# Patient Record
Sex: FEMALE | Race: WHITE | NOT HISPANIC OR LATINO | Employment: OTHER | URBAN - NONMETROPOLITAN AREA
[De-identification: names, ages, dates, MRNs, and addresses within clinical notes are randomized per-mention and may not be internally consistent; named-entity substitution may affect disease eponyms.]

---

## 2023-02-27 PROBLEM — R11.0 NAUSEA: Status: ACTIVE | Noted: 2023-02-27

## 2023-02-27 PROBLEM — I10 HYPERTENSION, UNCONTROLLED: Status: ACTIVE | Noted: 2023-02-27

## 2023-02-27 PROBLEM — L40.9 PSORIASIS: Status: ACTIVE | Noted: 2023-02-27

## 2023-02-27 PROBLEM — R39.9 UTI SYMPTOMS: Status: ACTIVE | Noted: 2023-02-27

## 2023-02-27 PROBLEM — R92.8 ABNORMAL MAMMOGRAM: Status: ACTIVE | Noted: 2023-02-27

## 2023-02-27 PROBLEM — N31.8 HYPERACTIVITY OF BLADDER: Status: ACTIVE | Noted: 2023-02-27

## 2023-02-27 PROBLEM — R14.0 ABDOMINAL BLOATING WITH CRAMPS: Status: ACTIVE | Noted: 2023-02-27

## 2023-02-27 PROBLEM — E53.8 VITAMIN B 12 DEFICIENCY: Status: ACTIVE | Noted: 2023-02-27

## 2023-02-27 PROBLEM — E11.9 DIABETES MELLITUS TYPE 2, NONINSULIN DEPENDENT (MULTI): Status: ACTIVE | Noted: 2023-02-27

## 2023-02-27 PROBLEM — E83.42 HYPOMAGNESEMIA: Status: ACTIVE | Noted: 2023-02-27

## 2023-02-27 PROBLEM — D89.0 POLYCLONAL GAMMOPATHY: Status: ACTIVE | Noted: 2023-02-27

## 2023-02-27 PROBLEM — I49.1 PAC (PREMATURE ATRIAL CONTRACTION): Status: ACTIVE | Noted: 2023-02-27

## 2023-02-27 PROBLEM — I50.9 CHF (NYHA CLASS II, ACC/AHA STAGE C) (MULTI): Status: ACTIVE | Noted: 2023-02-27

## 2023-02-27 PROBLEM — E87.1 HYPONATREMIA: Status: ACTIVE | Noted: 2023-02-27

## 2023-02-27 PROBLEM — T46.0X1A DIGOXIN TOXICITY: Status: ACTIVE | Noted: 2023-02-27

## 2023-02-27 PROBLEM — G47.00 INSOMNIA: Status: ACTIVE | Noted: 2023-02-27

## 2023-02-27 PROBLEM — E78.00 HYPERCHOLESTEROLEMIA: Status: ACTIVE | Noted: 2023-02-27

## 2023-02-27 PROBLEM — I25.10 2-VESSEL CORONARY ARTERY DISEASE: Status: ACTIVE | Noted: 2023-02-27

## 2023-02-27 PROBLEM — D64.9 ANEMIA: Status: ACTIVE | Noted: 2023-02-27

## 2023-02-27 PROBLEM — E11.29 PROTEINURIA DUE TO TYPE 2 DIABETES MELLITUS (MULTI): Status: ACTIVE | Noted: 2023-02-27

## 2023-02-27 PROBLEM — R60.0 BILATERAL EDEMA OF LOWER EXTREMITY: Status: ACTIVE | Noted: 2023-02-27

## 2023-02-27 PROBLEM — R10.9 ABDOMINAL BLOATING WITH CRAMPS: Status: ACTIVE | Noted: 2023-02-27

## 2023-02-27 PROBLEM — R39.9 URINARY SYMPTOM OR SIGN: Status: ACTIVE | Noted: 2023-02-27

## 2023-02-27 PROBLEM — H40.10X0 OPEN-ANGLE GLAUCOMA: Status: ACTIVE | Noted: 2023-02-27

## 2023-02-27 PROBLEM — R80.9 PROTEINURIA DUE TO TYPE 2 DIABETES MELLITUS (MULTI): Status: ACTIVE | Noted: 2023-02-27

## 2023-02-27 PROBLEM — E55.9 VITAMIN D DEFICIENCY DISEASE: Status: ACTIVE | Noted: 2023-02-27

## 2023-02-27 PROBLEM — I35.1 NONRHEUMATIC AORTIC (VALVE) INSUFFICIENCY: Status: ACTIVE | Noted: 2023-02-27

## 2023-02-27 PROBLEM — R10.30 LOWER ABDOMINAL PAIN: Status: ACTIVE | Noted: 2023-02-27

## 2023-02-27 PROBLEM — R53.82 CHRONIC FATIGUE: Status: ACTIVE | Noted: 2023-02-27

## 2023-02-27 PROBLEM — R77.8 OTHER SPECIFIED ABNORMALITIES OF PLASMA PROTEINS: Status: ACTIVE | Noted: 2023-02-27

## 2023-02-27 PROBLEM — K25.0 ACUTE GASTRIC ULCER WITH HEMORRHAGE: Status: ACTIVE | Noted: 2023-02-27

## 2023-02-27 PROBLEM — I50.40: Status: ACTIVE | Noted: 2023-02-27

## 2023-02-27 PROBLEM — I34.0 MITRAL REGURGITATION: Status: ACTIVE | Noted: 2023-02-27

## 2023-02-27 PROBLEM — K21.9 GASTROESOPHAGEAL REFLUX DISEASE: Status: ACTIVE | Noted: 2023-02-27

## 2023-02-27 PROBLEM — J32.9 CHRONIC SINUSITIS: Status: ACTIVE | Noted: 2023-02-27

## 2023-02-27 PROBLEM — I48.91 ATRIAL FIBRILLATION (MULTI): Status: ACTIVE | Noted: 2023-02-27

## 2023-02-27 PROBLEM — N18.31 STAGE 3A CHRONIC KIDNEY DISEASE (MULTI): Status: ACTIVE | Noted: 2023-02-27

## 2023-02-27 PROBLEM — R92.8 ABNORMAL MAMMOGRAM OF LEFT BREAST: Status: ACTIVE | Noted: 2023-02-27

## 2023-02-27 PROBLEM — E87.6 HYPOKALEMIA: Status: ACTIVE | Noted: 2023-02-27

## 2023-02-27 PROBLEM — K63.5 HYPERPLASTIC COLON POLYP: Status: ACTIVE | Noted: 2023-02-27

## 2023-02-27 RX ORDER — SPIRONOLACTONE 25 MG
1 TABLET ORAL DAILY
COMMUNITY

## 2023-02-27 RX ORDER — BLOOD SUGAR DIAGNOSTIC
STRIP MISCELLANEOUS
COMMUNITY
Start: 2016-07-12 | End: 2023-04-11

## 2023-02-27 RX ORDER — ISOSORBIDE MONONITRATE 60 MG/1
60 TABLET, EXTENDED RELEASE ORAL DAILY
COMMUNITY
Start: 2016-10-26

## 2023-02-27 RX ORDER — CLOPIDOGREL BISULFATE 75 MG/1
75 TABLET ORAL DAILY
COMMUNITY

## 2023-02-27 RX ORDER — AMLODIPINE BESYLATE 5 MG/1
5 TABLET ORAL NIGHTLY
COMMUNITY
Start: 2016-05-26 | End: 2023-04-10

## 2023-02-27 RX ORDER — DAPAGLIFLOZIN 5 MG/1
5 TABLET, FILM COATED ORAL EVERY MORNING
COMMUNITY
End: 2023-03-27 | Stop reason: SINTOL

## 2023-02-27 RX ORDER — FERROUS SULFATE 325(65) MG
1 TABLET ORAL DAILY
COMMUNITY
Start: 2022-08-23

## 2023-02-27 RX ORDER — PANTOPRAZOLE SODIUM 40 MG/1
TABLET, DELAYED RELEASE ORAL
COMMUNITY

## 2023-02-27 RX ORDER — METOPROLOL SUCCINATE 100 MG/1
1 TABLET, EXTENDED RELEASE ORAL DAILY
COMMUNITY
Start: 2016-04-18 | End: 2024-05-22 | Stop reason: ALTCHOICE

## 2023-02-27 RX ORDER — LANCETS
EACH MISCELLANEOUS
COMMUNITY
End: 2023-12-12

## 2023-02-27 RX ORDER — BRIMONIDINE TARTRATE AND TIMOLOL MALEATE 2; 5 MG/ML; MG/ML
SOLUTION OPHTHALMIC
COMMUNITY
Start: 2016-12-09

## 2023-02-27 RX ORDER — MUPIROCIN CALCIUM 20 MG/G
CREAM TOPICAL
COMMUNITY
Start: 2018-01-17 | End: 2023-11-07 | Stop reason: SDUPTHER

## 2023-02-27 RX ORDER — CLOPIDOGREL BISULFATE 75 MG/1
1 TABLET ORAL DAILY
COMMUNITY
Start: 2016-08-09 | End: 2023-05-03 | Stop reason: SDUPTHER

## 2023-02-27 RX ORDER — CALCIUM CARBONATE/VITAMIN D3 500-10/5ML
1 LIQUID (ML) ORAL DAILY
COMMUNITY

## 2023-02-27 RX ORDER — LOVASTATIN 20 MG/1
20 TABLET ORAL DAILY
COMMUNITY
Start: 2014-07-22 | End: 2023-03-27 | Stop reason: SDUPTHER

## 2023-02-27 RX ORDER — SUCRALFATE 1 G/10ML
SUSPENSION ORAL
COMMUNITY
End: 2023-11-07 | Stop reason: ALTCHOICE

## 2023-02-27 RX ORDER — CHOLECALCIFEROL (VITAMIN D3) 25 MCG
TABLET ORAL
COMMUNITY
Start: 2017-05-08

## 2023-02-27 RX ORDER — FUROSEMIDE 20 MG/1
1 TABLET ORAL DAILY
COMMUNITY
Start: 2016-04-18 | End: 2023-05-03 | Stop reason: SDUPTHER

## 2023-02-27 RX ORDER — METFORMIN HYDROCHLORIDE 500 MG/1
500 TABLET ORAL
COMMUNITY
Start: 2015-08-25 | End: 2023-03-27 | Stop reason: SDUPTHER

## 2023-02-27 RX ORDER — LISINOPRIL 20 MG/1
1 TABLET ORAL DAILY
COMMUNITY
Start: 2016-09-29 | End: 2023-03-27 | Stop reason: SDUPTHER

## 2023-02-27 RX ORDER — DIGOXIN 125 MCG
125 TABLET ORAL
COMMUNITY

## 2023-03-03 LAB
APPEARANCE, URINE: CLEAR
BACTERIA, URINE: ABNORMAL /HPF
BILIRUBIN, URINE: NEGATIVE
BLOOD, URINE: NEGATIVE
COLOR, URINE: YELLOW
GLUCOSE, URINE: NEGATIVE MG/DL
HYALINE CASTS, URINE: ABNORMAL /LPF
KETONES, URINE: NEGATIVE MG/DL
LEUKOCYTE ESTERASE, URINE: ABNORMAL
NITRITE, URINE: NEGATIVE
PH, URINE: 6 (ref 5–8)
PROTEIN, URINE: ABNORMAL MG/DL
RBC, URINE: ABNORMAL /HPF (ref 0–5)
SPECIFIC GRAVITY, URINE: 1.01 (ref 1–1.03)
SQUAMOUS EPITHELIAL CELLS, URINE: 1 /HPF
UROBILINOGEN, URINE: <2 MG/DL (ref 0–1.9)
WBC, URINE: 7 /HPF (ref 0–5)

## 2023-03-05 LAB — URINE CULTURE: NO GROWTH

## 2023-03-23 ENCOUNTER — APPOINTMENT (OUTPATIENT)
Dept: PRIMARY CARE | Facility: CLINIC | Age: 83
End: 2023-03-23
Payer: MEDICARE

## 2023-03-27 ENCOUNTER — OFFICE VISIT (OUTPATIENT)
Dept: PRIMARY CARE | Facility: CLINIC | Age: 83
End: 2023-03-27
Payer: MEDICARE

## 2023-03-27 VITALS
OXYGEN SATURATION: 100 % | HEART RATE: 71 BPM | WEIGHT: 111.2 LBS | BODY MASS INDEX: 20.34 KG/M2 | TEMPERATURE: 96.4 F | SYSTOLIC BLOOD PRESSURE: 120 MMHG | DIASTOLIC BLOOD PRESSURE: 70 MMHG

## 2023-03-27 DIAGNOSIS — I48.11 LONGSTANDING PERSISTENT ATRIAL FIBRILLATION (MULTI): ICD-10-CM

## 2023-03-27 DIAGNOSIS — E11.9 DIABETES MELLITUS TYPE 2, NONINSULIN DEPENDENT (MULTI): ICD-10-CM

## 2023-03-27 DIAGNOSIS — R80.9 PROTEINURIA DUE TO TYPE 2 DIABETES MELLITUS (MULTI): ICD-10-CM

## 2023-03-27 DIAGNOSIS — E78.00 HYPERCHOLESTEROLEMIA: ICD-10-CM

## 2023-03-27 DIAGNOSIS — E11.29 PROTEINURIA DUE TO TYPE 2 DIABETES MELLITUS (MULTI): ICD-10-CM

## 2023-03-27 DIAGNOSIS — E46 PROTEIN-CALORIE MALNUTRITION, UNSPECIFIED SEVERITY (MULTI): ICD-10-CM

## 2023-03-27 DIAGNOSIS — N18.31 STAGE 3A CHRONIC KIDNEY DISEASE (MULTI): ICD-10-CM

## 2023-03-27 DIAGNOSIS — I10 HYPERTENSION, UNCONTROLLED: Primary | ICD-10-CM

## 2023-03-27 DIAGNOSIS — I50.40: ICD-10-CM

## 2023-03-27 PROCEDURE — 3074F SYST BP LT 130 MM HG: CPT | Performed by: INTERNAL MEDICINE

## 2023-03-27 PROCEDURE — 1160F RVW MEDS BY RX/DR IN RCRD: CPT | Performed by: INTERNAL MEDICINE

## 2023-03-27 PROCEDURE — 3078F DIAST BP <80 MM HG: CPT | Performed by: INTERNAL MEDICINE

## 2023-03-27 PROCEDURE — 99214 OFFICE O/P EST MOD 30 MIN: CPT | Performed by: INTERNAL MEDICINE

## 2023-03-27 PROCEDURE — 1159F MED LIST DOCD IN RCRD: CPT | Performed by: INTERNAL MEDICINE

## 2023-03-27 PROCEDURE — 1036F TOBACCO NON-USER: CPT | Performed by: INTERNAL MEDICINE

## 2023-03-27 RX ORDER — METFORMIN HYDROCHLORIDE 500 MG/1
500 TABLET ORAL
Qty: 180 TABLET | Refills: 0 | Status: SHIPPED | OUTPATIENT
Start: 2023-03-27 | End: 2023-05-03 | Stop reason: SINTOL

## 2023-03-27 RX ORDER — LISINOPRIL 20 MG/1
20 TABLET ORAL DAILY
Qty: 90 TABLET | Refills: 1 | Status: SHIPPED | OUTPATIENT
Start: 2023-03-27 | End: 2023-09-11

## 2023-03-27 RX ORDER — LOVASTATIN 20 MG/1
20 TABLET ORAL DAILY
Qty: 90 TABLET | Refills: 1 | Status: SHIPPED | OUTPATIENT
Start: 2023-03-27 | End: 2023-09-11

## 2023-03-27 ASSESSMENT — ENCOUNTER SYMPTOMS
ARTHRALGIAS: 0
HEADACHES: 0
SINUS PAIN: 0
COUGH: 0
DIZZINESS: 0
DIARRHEA: 0
BRUISES/BLEEDS EASILY: 0
HYPERTENSION: 1
FATIGUE: 0
BLOOD IN STOOL: 0
DIFFICULTY URINATING: 0
UNEXPECTED WEIGHT CHANGE: 0
WHEEZING: 0
ABDOMINAL PAIN: 0
FEVER: 0
PALPITATIONS: 0
SORE THROAT: 0

## 2023-03-27 NOTE — PROGRESS NOTES
Subjective   Patient ID: Sofie Ulloa is a 82 y.o. female who presents for Hypertension, Hyperlipidemia, Abdominal Pain (Got better so cancelled ct after insurance denied the ct it), Med Refill, and Vaginal Itching (With farxiga, goes when she doesn't take it for a few days).     Patient started on Farxiga did not tolerated due to recurrent urinary urgency frequency and burning sensation  -Dysuria secondary to Farxiga need to discontinue  - Diabetes uncontrolled need to resume metformin 500 mg twice a day reevaluate patient again for side effect in 4 weeks  - Abdominal pain resolved.  Continue with high-fiber diet underlying irritable bowel syndrome  - CHF compensated  -History of gastric ulcer in remission  -Chronic kidney disease stable avoid any salt maximize medical management increase fluid intake  -Hypertension controlled  - Hypercholesterolemia controlled continue with current medication    Hypertension  Pertinent negatives include no chest pain, headaches or palpitations.   Hyperlipidemia  Pertinent negatives include no chest pain.   Abdominal Pain  Pertinent negatives include no arthralgias, diarrhea, fever or headaches.   Med Refill  Pertinent negatives include no abdominal pain, arthralgias, chest pain, congestion, coughing, fatigue, fever, headaches, rash or sore throat.   Vaginal Itching  Pertinent negatives include no abdominal pain, diarrhea, fever, headaches, rash or sore throat.          Review of Systems   Constitutional:  Negative for fatigue, fever and unexpected weight change.   HENT:  Negative for congestion, ear discharge, ear pain, mouth sores, sinus pain and sore throat.    Eyes:  Negative for visual disturbance.   Respiratory:  Negative for cough and wheezing.    Cardiovascular:  Negative for chest pain, palpitations and leg swelling.   Gastrointestinal:  Negative for abdominal pain, blood in stool and diarrhea.   Genitourinary:  Negative for difficulty urinating.   Musculoskeletal:   Negative for arthralgias.   Skin:  Negative for rash.   Neurological:  Negative for dizziness and headaches.   Hematological:  Does not bruise/bleed easily.   Psychiatric/Behavioral:  Negative for behavioral problems.    All other systems reviewed and are negative.      Objective   /70   Pulse 71   Temp 35.8 °C (96.4 °F)   Wt 50.4 kg (111 lb 3.2 oz)   SpO2 100%   BMI 20.34 kg/m²     Physical Exam  Vitals and nursing note reviewed.   Constitutional:       Appearance: Normal appearance.   HENT:      Head: Normocephalic.      Nose: Nose normal.   Eyes:      Conjunctiva/sclera: Conjunctivae normal.      Pupils: Pupils are equal, round, and reactive to light.   Cardiovascular:      Rate and Rhythm: Regular rhythm.   Pulmonary:      Effort: Pulmonary effort is normal.      Breath sounds: Normal breath sounds.   Abdominal:      General: Abdomen is flat.      Palpations: Abdomen is soft.   Musculoskeletal:      Cervical back: Neck supple.   Skin:     General: Skin is warm.   Neurological:      General: No focal deficit present.      Mental Status: She is oriented to person, place, and time.   Psychiatric:         Mood and Affect: Mood normal.         Assessment/Plan   Problem List Items Addressed This Visit          Circulatory    Combined systolic and diastolic congestive heart failure, NYHA class 2, unspecified congestive heart failure chronicity (CMS/HCC)    Atrial fibrillation (CMS/HCC)    Hypertension, uncontrolled - Primary    Relevant Medications    lisinopril 20 mg tablet       Genitourinary    Stage 3a chronic kidney disease       Endocrine/Metabolic    Diabetes mellitus type 2, noninsulin dependent (CMS/HCC)    Relevant Medications    metFORMIN (Glucophage) 500 mg tablet    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 (CMS/HCC)    Protein-calorie malnutrition, unspecified severity (CMS/HCC)       Other    Hypercholesterolemia    Relevant Medications    lovastatin (Mevacor) 20 mg tablet

## 2023-04-07 DIAGNOSIS — E11.9 TYPE 2 DIABETES MELLITUS WITHOUT COMPLICATIONS (MULTI): ICD-10-CM

## 2023-04-08 DIAGNOSIS — I10 ESSENTIAL (PRIMARY) HYPERTENSION: ICD-10-CM

## 2023-04-10 RX ORDER — AMLODIPINE BESYLATE 5 MG/1
TABLET ORAL
Qty: 30 TABLET | Refills: 0 | Status: SHIPPED | OUTPATIENT
Start: 2023-04-10 | End: 2023-06-09 | Stop reason: SDUPTHER

## 2023-04-11 RX ORDER — BLOOD SUGAR DIAGNOSTIC
STRIP MISCELLANEOUS
Qty: 100 STRIP | Refills: 1 | Status: SHIPPED | OUTPATIENT
Start: 2023-04-11 | End: 2023-07-18

## 2023-05-03 ENCOUNTER — OFFICE VISIT (OUTPATIENT)
Dept: PRIMARY CARE | Facility: CLINIC | Age: 83
End: 2023-05-03
Payer: MEDICARE

## 2023-05-03 VITALS
SYSTOLIC BLOOD PRESSURE: 128 MMHG | DIASTOLIC BLOOD PRESSURE: 64 MMHG | TEMPERATURE: 96.1 F | OXYGEN SATURATION: 98 % | HEART RATE: 56 BPM | WEIGHT: 109.8 LBS | BODY MASS INDEX: 21.09 KG/M2

## 2023-05-03 DIAGNOSIS — E11.65 TYPE 2 DIABETES MELLITUS WITH HYPERGLYCEMIA, WITHOUT LONG-TERM CURRENT USE OF INSULIN (MULTI): Primary | ICD-10-CM

## 2023-05-03 DIAGNOSIS — R60.0 BILATERAL EDEMA OF LOWER EXTREMITY: ICD-10-CM

## 2023-05-03 DIAGNOSIS — R10.9 ABDOMINAL BLOATING WITH CRAMPS: ICD-10-CM

## 2023-05-03 DIAGNOSIS — R14.0 ABDOMINAL BLOATING WITH CRAMPS: ICD-10-CM

## 2023-05-03 DIAGNOSIS — I10 HYPERTENSION, UNCONTROLLED: ICD-10-CM

## 2023-05-03 PROCEDURE — 3078F DIAST BP <80 MM HG: CPT | Performed by: INTERNAL MEDICINE

## 2023-05-03 PROCEDURE — 1157F ADVNC CARE PLAN IN RCRD: CPT | Performed by: INTERNAL MEDICINE

## 2023-05-03 PROCEDURE — 99214 OFFICE O/P EST MOD 30 MIN: CPT | Performed by: INTERNAL MEDICINE

## 2023-05-03 PROCEDURE — 1159F MED LIST DOCD IN RCRD: CPT | Performed by: INTERNAL MEDICINE

## 2023-05-03 PROCEDURE — 1036F TOBACCO NON-USER: CPT | Performed by: INTERNAL MEDICINE

## 2023-05-03 PROCEDURE — 1160F RVW MEDS BY RX/DR IN RCRD: CPT | Performed by: INTERNAL MEDICINE

## 2023-05-03 PROCEDURE — 3074F SYST BP LT 130 MM HG: CPT | Performed by: INTERNAL MEDICINE

## 2023-05-03 RX ORDER — FUROSEMIDE 20 MG/1
20 TABLET ORAL DAILY
Qty: 90 TABLET | Refills: 1 | Status: SHIPPED | OUTPATIENT
Start: 2023-05-03 | End: 2023-10-02

## 2023-05-03 ASSESSMENT — ENCOUNTER SYMPTOMS
ABDOMINAL PAIN: 0
DIFFICULTY URINATING: 0
SORE THROAT: 0
DIZZINESS: 0
HYPERTENSION: 1
ARTHRALGIAS: 0
COUGH: 0
BLOOD IN STOOL: 0
HEADACHES: 0
FATIGUE: 0
PALPITATIONS: 0
UNEXPECTED WEIGHT CHANGE: 0
WHEEZING: 0
FEVER: 0
SINUS PAIN: 0
DIARRHEA: 0
BRUISES/BLEEDS EASILY: 0

## 2023-05-03 NOTE — PROGRESS NOTES
Subjective   Patient ID: Sofie Ulloa is a 82 y.o. female who presents for Hypertension, Diabetes (Metformin has been bothering her stomach, has stopped over the last week), and Hyperlipidemia.    - Diabetes uncontrolled patient started metformin again developed abdominal cramps did not tolerated patient discontinued did not tolerate Farxiga due to urinary tract infections  Blood sugar range 1 14-1 60  Continue with diet control  Obtain hemoglobin A1c in 3 months  Start on Januvia follow-up results closely  -Patient counseled about underweight increase protein in her diet follow-up BMI closely  - CHF compensated  - Chronic hyponatremia under control  -History of gastric ulcer in remission  -Chronic kidney disease stable avoid any salt maximize medical management increase fluid intake  -Hypertension controlled  - Hypercholesterolemia controlled continue with current medication        Hypertension  Pertinent negatives include no chest pain, headaches or palpitations.   Diabetes  Pertinent negatives for hypoglycemia include no dizziness or headaches. Pertinent negatives for diabetes include no chest pain and no fatigue.   Hyperlipidemia  Pertinent negatives include no chest pain.          Review of Systems   Constitutional:  Negative for fatigue, fever and unexpected weight change.   HENT:  Negative for congestion, ear discharge, ear pain, mouth sores, sinus pain and sore throat.    Eyes:  Negative for visual disturbance.   Respiratory:  Negative for cough and wheezing.    Cardiovascular:  Negative for chest pain, palpitations and leg swelling.   Gastrointestinal:  Negative for abdominal pain, blood in stool and diarrhea.   Genitourinary:  Negative for difficulty urinating.   Musculoskeletal:  Negative for arthralgias.   Skin:  Negative for rash.   Neurological:  Negative for dizziness and headaches.   Hematological:  Does not bruise/bleed easily.   Psychiatric/Behavioral:  Negative for behavioral problems.    All other  systems reviewed and are negative.      Objective   No results found for: HGBA1C   /64   Pulse 56   Temp 35.6 °C (96.1 °F)   Wt 49.8 kg (109 lb 12.8 oz)   SpO2 98%   BMI 21.09 kg/m²   Lab Results   Component Value Date    WBC 10.1 02/16/2023    HGB 12.4 02/16/2023    HCT 39.5 02/16/2023     02/16/2023    CHOL 118 08/12/2022    TRIG 130 08/12/2022    HDL 40.0 08/12/2022    ALT 7 09/26/2022    AST 16 09/26/2022     (L) 02/16/2023    K 4.3 02/16/2023    CL 96 (L) 02/16/2023    CREATININE 0.90 02/16/2023    BUN 10 02/16/2023    CO2 29 02/16/2023    TSH 1.41 08/12/2022    INR 1.5 (H) 12/09/2019     par   Physical Exam  Vitals and nursing note reviewed.   Constitutional:       Appearance: Normal appearance.   HENT:      Head: Normocephalic.      Nose: Nose normal.   Eyes:      Conjunctiva/sclera: Conjunctivae normal.      Pupils: Pupils are equal, round, and reactive to light.   Cardiovascular:      Rate and Rhythm: Regular rhythm.   Pulmonary:      Effort: Pulmonary effort is normal.      Breath sounds: Normal breath sounds.   Abdominal:      General: Abdomen is flat.      Palpations: Abdomen is soft.   Musculoskeletal:      Cervical back: Neck supple.   Skin:     General: Skin is warm.   Neurological:      General: No focal deficit present.      Mental Status: She is oriented to person, place, and time.   Psychiatric:         Mood and Affect: Mood normal.         Assessment/Plan   Sofie was seen today for hypertension, diabetes and hyperlipidemia.  Diagnoses and all orders for this visit:  Type 2 diabetes mellitus with hyperglycemia, without long-term current use of insulin (CMS/[facility]) (Primary)  -     SITagliptin phosphate (Januvia) 100 mg tablet; Take 1 tablet (100 mg) by mouth once daily.  Bilateral edema of lower extremity  -     furosemide (Lasix) 20 mg tablet; Take 1 tablet (20 mg) by mouth once daily.  Abdominal bloating with cramps  Hypertension, uncontrolled  Other orders  -     Follow Up  In Primary Care  -     3 Month Follow Up In Primary Care; Future  -     Follow Up In Primary Care; Future     - Diabetes uncontrolled patient started metformin again developed abdominal cramps did not tolerated patient discontinued did not tolerate Farxiga due to urinary tract infections  Blood sugar range 1 14-1 60  Continue with diet control  Obtain hemoglobin A1c in 3 months  Start on Januvia follow-up results closely  -Patient counseled about underweight increase protein in her diet follow-up BMI closely  - CHF compensated  - Chronic hyponatremia under control  -History of gastric ulcer in remission  -Chronic kidney disease stable avoid any salt maximize medical management increase fluid intake  -Hypertension controlled  - Hypercholesterolemia controlled continue with current medication

## 2023-06-07 ENCOUNTER — HOSPITAL ENCOUNTER (OUTPATIENT)
Dept: DATA CONVERSION | Facility: HOSPITAL | Age: 83
End: 2023-06-07
Attending: INTERNAL MEDICINE | Admitting: INTERNAL MEDICINE
Payer: MEDICARE

## 2023-06-07 DIAGNOSIS — E78.5 HYPERLIPIDEMIA, UNSPECIFIED: ICD-10-CM

## 2023-06-07 DIAGNOSIS — I34.0 NONRHEUMATIC MITRAL (VALVE) INSUFFICIENCY: ICD-10-CM

## 2023-06-07 DIAGNOSIS — I50.9 HEART FAILURE, UNSPECIFIED (MULTI): ICD-10-CM

## 2023-06-07 DIAGNOSIS — M19.90 UNSPECIFIED OSTEOARTHRITIS, UNSPECIFIED SITE: ICD-10-CM

## 2023-06-07 DIAGNOSIS — I13.0 HYPERTENSIVE HEART AND CHRONIC KIDNEY DISEASE WITH HEART FAILURE AND STAGE 1 THROUGH STAGE 4 CHRONIC KIDNEY DISEASE, OR UNSPECIFIED CHRONIC KIDNEY DISEASE (MULTI): ICD-10-CM

## 2023-06-07 DIAGNOSIS — Z88.1 ALLERGY STATUS TO OTHER ANTIBIOTIC AGENTS: ICD-10-CM

## 2023-06-07 DIAGNOSIS — E83.42 HYPOMAGNESEMIA: ICD-10-CM

## 2023-06-07 DIAGNOSIS — D89.0 POLYCLONAL HYPERGAMMAGLOBULINEMIA: ICD-10-CM

## 2023-06-07 DIAGNOSIS — E11.22 TYPE 2 DIABETES MELLITUS WITH DIABETIC CHRONIC KIDNEY DISEASE (MULTI): ICD-10-CM

## 2023-06-07 DIAGNOSIS — I48.20 CHRONIC ATRIAL FIBRILLATION, UNSPECIFIED (MULTI): ICD-10-CM

## 2023-06-07 DIAGNOSIS — Z88.2 ALLERGY STATUS TO SULFONAMIDES: ICD-10-CM

## 2023-06-07 DIAGNOSIS — R63.4 ABNORMAL WEIGHT LOSS: ICD-10-CM

## 2023-06-07 DIAGNOSIS — E87.1 HYPO-OSMOLALITY AND HYPONATREMIA: ICD-10-CM

## 2023-06-07 DIAGNOSIS — Z12.11 ENCOUNTER FOR SCREENING FOR MALIGNANT NEOPLASM OF COLON: ICD-10-CM

## 2023-06-07 DIAGNOSIS — K21.00 GASTRO-ESOPHAGEAL REFLUX DISEASE WITH ESOPHAGITIS, WITHOUT BLEEDING: ICD-10-CM

## 2023-06-07 DIAGNOSIS — N18.30 CHRONIC KIDNEY DISEASE, STAGE 3 UNSPECIFIED (MULTI): ICD-10-CM

## 2023-06-07 DIAGNOSIS — Z79.01 LONG TERM (CURRENT) USE OF ANTICOAGULANTS: ICD-10-CM

## 2023-06-07 DIAGNOSIS — D50.9 IRON DEFICIENCY ANEMIA, UNSPECIFIED: ICD-10-CM

## 2023-06-07 DIAGNOSIS — I25.119 ATHEROSCLEROTIC HEART DISEASE OF NATIVE CORONARY ARTERY WITH UNSPECIFIED ANGINA PECTORIS (CMS-HCC): ICD-10-CM

## 2023-06-07 DIAGNOSIS — R06.00 DYSPNEA, UNSPECIFIED: ICD-10-CM

## 2023-06-07 LAB — POCT GLUCOSE: 129 MG/DL (ref 74–99)

## 2023-06-09 DIAGNOSIS — I10 ESSENTIAL (PRIMARY) HYPERTENSION: ICD-10-CM

## 2023-06-09 RX ORDER — AMLODIPINE BESYLATE 5 MG/1
TABLET ORAL
Qty: 90 TABLET | Refills: 0 | Status: SHIPPED | OUTPATIENT
Start: 2023-06-09 | End: 2023-08-07 | Stop reason: SDUPTHER

## 2023-06-16 LAB
COMPLETE PATHOLOGY REPORT: NORMAL
CONVERTED CLINICAL DIAGNOSIS-HISTORY: NORMAL
CONVERTED FINAL DIAGNOSIS: NORMAL
CONVERTED FINAL REPORT PDF LINK TO COPY AND PASTE: NORMAL
CONVERTED GROSS DESCRIPTION: NORMAL

## 2023-07-18 DIAGNOSIS — E11.9 TYPE 2 DIABETES MELLITUS WITHOUT COMPLICATIONS (MULTI): ICD-10-CM

## 2023-07-18 RX ORDER — BLOOD SUGAR DIAGNOSTIC
STRIP MISCELLANEOUS
Qty: 100 STRIP | Refills: 1 | Status: SHIPPED | OUTPATIENT
Start: 2023-07-18 | End: 2024-02-06

## 2023-08-07 ENCOUNTER — OFFICE VISIT (OUTPATIENT)
Dept: PRIMARY CARE | Facility: CLINIC | Age: 83
End: 2023-08-07
Payer: MEDICARE

## 2023-08-07 VITALS
OXYGEN SATURATION: 98 % | HEART RATE: 65 BPM | SYSTOLIC BLOOD PRESSURE: 120 MMHG | BODY MASS INDEX: 21.09 KG/M2 | DIASTOLIC BLOOD PRESSURE: 62 MMHG | WEIGHT: 109.8 LBS | TEMPERATURE: 98.4 F

## 2023-08-07 DIAGNOSIS — E11.29 PROTEINURIA DUE TO TYPE 2 DIABETES MELLITUS (MULTI): ICD-10-CM

## 2023-08-07 DIAGNOSIS — K58.9 IRRITABLE BOWEL SYNDROME, UNSPECIFIED TYPE: ICD-10-CM

## 2023-08-07 DIAGNOSIS — E11.65 TYPE 2 DIABETES MELLITUS WITH HYPERGLYCEMIA, WITHOUT LONG-TERM CURRENT USE OF INSULIN (MULTI): ICD-10-CM

## 2023-08-07 DIAGNOSIS — I35.1 NONRHEUMATIC AORTIC (VALVE) INSUFFICIENCY: ICD-10-CM

## 2023-08-07 DIAGNOSIS — R80.9 PROTEINURIA DUE TO TYPE 2 DIABETES MELLITUS (MULTI): ICD-10-CM

## 2023-08-07 DIAGNOSIS — E78.00 HYPERCHOLESTEROLEMIA: ICD-10-CM

## 2023-08-07 DIAGNOSIS — I25.10 2-VESSEL CORONARY ARTERY DISEASE: ICD-10-CM

## 2023-08-07 DIAGNOSIS — I10 ESSENTIAL (PRIMARY) HYPERTENSION: ICD-10-CM

## 2023-08-07 DIAGNOSIS — I10 HYPERTENSION, UNCONTROLLED: ICD-10-CM

## 2023-08-07 DIAGNOSIS — Z12.31 ENCOUNTER FOR SCREENING MAMMOGRAM FOR MALIGNANT NEOPLASM OF BREAST: Primary | ICD-10-CM

## 2023-08-07 PROCEDURE — 3078F DIAST BP <80 MM HG: CPT | Performed by: INTERNAL MEDICINE

## 2023-08-07 PROCEDURE — 1160F RVW MEDS BY RX/DR IN RCRD: CPT | Performed by: INTERNAL MEDICINE

## 2023-08-07 PROCEDURE — 99214 OFFICE O/P EST MOD 30 MIN: CPT | Performed by: INTERNAL MEDICINE

## 2023-08-07 PROCEDURE — 1159F MED LIST DOCD IN RCRD: CPT | Performed by: INTERNAL MEDICINE

## 2023-08-07 PROCEDURE — 3074F SYST BP LT 130 MM HG: CPT | Performed by: INTERNAL MEDICINE

## 2023-08-07 PROCEDURE — 1036F TOBACCO NON-USER: CPT | Performed by: INTERNAL MEDICINE

## 2023-08-07 PROCEDURE — 1157F ADVNC CARE PLAN IN RCRD: CPT | Performed by: INTERNAL MEDICINE

## 2023-08-07 RX ORDER — AMLODIPINE BESYLATE 5 MG/1
5 TABLET ORAL DAILY
Qty: 90 TABLET | Refills: 0 | Status: SHIPPED | OUTPATIENT
Start: 2023-08-07 | End: 2023-11-07 | Stop reason: SDUPTHER

## 2023-08-07 ASSESSMENT — ENCOUNTER SYMPTOMS
HEADACHES: 0
ARTHRALGIAS: 0
BRUISES/BLEEDS EASILY: 0
UNEXPECTED WEIGHT CHANGE: 0
WHEEZING: 0
DIZZINESS: 0
DIARRHEA: 0
DIFFICULTY URINATING: 0
BLOOD IN STOOL: 0
PALPITATIONS: 0
COUGH: 0
SORE THROAT: 0
FEVER: 0
ABDOMINAL PAIN: 0
SINUS PAIN: 0
FATIGUE: 0

## 2023-08-07 ASSESSMENT — PATIENT HEALTH QUESTIONNAIRE - PHQ9
SUM OF ALL RESPONSES TO PHQ9 QUESTIONS 1 AND 2: 0
1. LITTLE INTEREST OR PLEASURE IN DOING THINGS: NOT AT ALL
2. FEELING DOWN, DEPRESSED OR HOPELESS: NOT AT ALL

## 2023-08-07 NOTE — PROGRESS NOTES
"Subjective   Patient ID: Sofie Ulloa is a 83 y.o. female who presents for Follow-up (3 mth follow up. Had scopes stomach is feeling better. ) and Medication Issue (Since starting januvia sugars have not been that good. Does not feel like it is helping. Takes eliquis  would like to talk about a program to help pay for it. Yesenia Mujica was supposed to send an email about it. ).    - Diabetes doing much better now on Januvia blood sugar record from home controlled follow-up hemoglobin A1c in 3 months  Did not tolerate metformin  -Seen by gastroenterology upper and lower endoscopy no significant disease  - IBS continue monitoring closely symptoms are controlled now  - CHF compensated  - Chronic hyponatremia under control follow-up BMP  -History of gastric ulcer in remission  -Chronic kidney disease stable avoid any salt maximize medical management increase fluid intake  -Hypertension controlled  - Hypercholesterolemia controlled continue with current medication  Needs screening mammogram before next appointment  Follow-up 3 months                Review of Systems   Constitutional:  Negative for fatigue, fever and unexpected weight change.   HENT:  Negative for congestion, ear discharge, ear pain, mouth sores, sinus pain and sore throat.    Eyes:  Negative for visual disturbance.   Respiratory:  Negative for cough and wheezing.    Cardiovascular:  Negative for chest pain, palpitations and leg swelling.   Gastrointestinal:  Negative for abdominal pain, blood in stool and diarrhea.   Genitourinary:  Negative for difficulty urinating.   Musculoskeletal:  Negative for arthralgias.   Skin:  Negative for rash.   Neurological:  Negative for dizziness and headaches.   Hematological:  Does not bruise/bleed easily.   Psychiatric/Behavioral:  Negative for behavioral problems.    All other systems reviewed and are negative.      Objective   No results found for: \"HGBA1C\"   /62   Pulse 65   Temp 36.9 °C (98.4 °F)   Wt 49.8 " kg (109 lb 12.8 oz)   SpO2 98%   BMI 21.09 kg/m²   Lab Results   Component Value Date    WBC 10.1 02/16/2023    HGB 12.4 02/16/2023    HCT 39.5 02/16/2023     02/16/2023    CHOL 118 08/12/2022    TRIG 130 08/12/2022    HDL 40.0 08/12/2022    ALT 7 09/26/2022    AST 16 09/26/2022     (L) 02/16/2023    K 4.3 02/16/2023    CL 96 (L) 02/16/2023    CREATININE 0.90 02/16/2023    BUN 10 02/16/2023    CO2 29 02/16/2023    TSH 1.41 08/12/2022    INR 1.5 (H) 12/09/2019     par   Physical Exam  Vitals and nursing note reviewed.   Constitutional:       Appearance: Normal appearance.   HENT:      Head: Normocephalic.      Nose: Nose normal.   Eyes:      Conjunctiva/sclera: Conjunctivae normal.      Pupils: Pupils are equal, round, and reactive to light.   Cardiovascular:      Rate and Rhythm: Regular rhythm.   Pulmonary:      Effort: Pulmonary effort is normal.      Breath sounds: Normal breath sounds.   Abdominal:      General: Abdomen is flat.      Palpations: Abdomen is soft.   Musculoskeletal:      Cervical back: Neck supple.   Skin:     General: Skin is warm.   Neurological:      General: No focal deficit present.      Mental Status: She is oriented to person, place, and time.   Psychiatric:         Mood and Affect: Mood normal.         Assessment/Plan   Sofie was seen today for follow-up and medication issue.  Diagnoses and all orders for this visit:  Encounter for screening mammogram for malignant neoplasm of breast (Primary)  -     BI mammo bilateral screening tomosynthesis; Future  Essential (primary) hypertension  -     amLODIPine (Norvasc) 5 mg tablet; Take 1 tablet (5 mg) by mouth once daily.  -     Basic metabolic panel; Future  Nonrheumatic aortic (valve) insufficiency  -     apixaban (Eliquis) 2.5 mg tablet; Take 1 tablet (2.5 mg) by mouth 2 times a day.  2-vessel coronary artery disease  Type 2 diabetes mellitus with hyperglycemia, without long-term current use of insulin (CMS/HCC)  Hypertension,  uncontrolled  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 (CMS/HCC)  Hypercholesterolemia  Irritable bowel syndrome, unspecified type  Other orders  -     3 Month Follow Up In Primary Care  -     Follow Up In Primary Care  -     Follow Up In Primary Care - Established; Future   - Diabetes doing much better now on Januvia blood sugar record from home controlled follow-up hemoglobin A1c in 3 months  Did not tolerate metformin  -Seen by gastroenterology upper and lower endoscopy no significant disease  - IBS continue monitoring closely symptoms are controlled now  - CHF compensated  - Chronic hyponatremia under control follow-up BMP  -History of gastric ulcer in remission  -Chronic kidney disease stable avoid any salt maximize medical management increase fluid intake  -Hypertension controlled  - Hypercholesterolemia controlled continue with current medication  Needs screening mammogram before next appointment  Follow-up 3 months

## 2023-08-10 DIAGNOSIS — I35.1 NONRHEUMATIC AORTIC (VALVE) INSUFFICIENCY: ICD-10-CM

## 2023-09-07 VITALS — BODY MASS INDEX: 20.28 KG/M2 | WEIGHT: 110.23 LBS | HEIGHT: 62 IN

## 2023-09-09 DIAGNOSIS — I10 HYPERTENSION, UNCONTROLLED: ICD-10-CM

## 2023-09-09 DIAGNOSIS — E78.00 HYPERCHOLESTEROLEMIA: ICD-10-CM

## 2023-09-11 RX ORDER — LISINOPRIL 20 MG/1
20 TABLET ORAL DAILY
Qty: 90 TABLET | Refills: 1 | Status: SHIPPED | OUTPATIENT
Start: 2023-09-11 | End: 2023-11-07 | Stop reason: SDUPTHER

## 2023-09-11 RX ORDER — LOVASTATIN 20 MG/1
20 TABLET ORAL DAILY
Qty: 90 TABLET | Refills: 1 | Status: SHIPPED | OUTPATIENT
Start: 2023-09-11 | End: 2023-11-07 | Stop reason: SDUPTHER

## 2023-09-30 NOTE — H&P
History & Physical Reviewed:   I have reviewed the History and Physical dated:  16-May-2023   History and Physical reviewed and relevant findings noted. Patient examined to review pertinent physical  findings.: No significant changes   Home Medications Reviewed: no changes noted   Allergies Reviewed: no changes noted       ERAS (Enhanced Recovery After Surgery):  ·  ERAS Patient: no     Consent:   COVID-19 Consent:  ·  COVID-19 Risk Consent Surgeon has reviewed key risks related to the risk of tia COVID-19 and if they contract COVID-19 what the risks are.       Electronic Signatures:  Nakia Gutiérrez (PAC)   (Signed 07-Jun-2023 07:27)   Entered: ERAS, Consent, Note Completion, History & Physical Reviewed   Authored: ERAS, Consent, History & Physical Reviewed, Note Completion  Rufus Riley ()   (Signed 21-Jun-2023 07:52)   Co-Signer: ERAS, Consent, Note Completion, History & Physical Reviewed    Last Updated: 21-Jun-2023 07:52 by Rufus Riley ()

## 2023-10-02 DIAGNOSIS — R60.0 BILATERAL EDEMA OF LOWER EXTREMITY: ICD-10-CM

## 2023-10-02 DIAGNOSIS — E11.65 TYPE 2 DIABETES MELLITUS WITH HYPERGLYCEMIA, WITHOUT LONG-TERM CURRENT USE OF INSULIN (MULTI): ICD-10-CM

## 2023-10-02 RX ORDER — SITAGLIPTIN 100 MG/1
100 TABLET, FILM COATED ORAL DAILY
Qty: 30 TABLET | Refills: 0 | Status: SHIPPED | OUTPATIENT
Start: 2023-10-02 | End: 2023-10-23

## 2023-10-02 RX ORDER — FUROSEMIDE 20 MG/1
20 TABLET ORAL DAILY
Qty: 30 TABLET | Refills: 0 | Status: SHIPPED | OUTPATIENT
Start: 2023-10-02 | End: 2023-11-07 | Stop reason: SDUPTHER

## 2023-10-09 DIAGNOSIS — I48.11 LONGSTANDING PERSISTENT ATRIAL FIBRILLATION (MULTI): ICD-10-CM

## 2023-10-09 DIAGNOSIS — T46.0X4D: ICD-10-CM

## 2023-10-13 ENCOUNTER — HOSPITAL ENCOUNTER (OUTPATIENT)
Dept: RADIOLOGY | Facility: HOSPITAL | Age: 83
Discharge: HOME | End: 2023-10-13
Payer: MEDICARE

## 2023-10-13 DIAGNOSIS — Z12.31 ENCOUNTER FOR SCREENING MAMMOGRAM FOR MALIGNANT NEOPLASM OF BREAST: ICD-10-CM

## 2023-10-13 PROCEDURE — 77063 BREAST TOMOSYNTHESIS BI: CPT | Mod: BILATERAL PROCEDURE | Performed by: RADIOLOGY

## 2023-10-13 PROCEDURE — 77067 SCR MAMMO BI INCL CAD: CPT | Mod: 50

## 2023-10-13 PROCEDURE — 77067 SCR MAMMO BI INCL CAD: CPT | Mod: BILATERAL PROCEDURE | Performed by: RADIOLOGY

## 2023-10-17 ENCOUNTER — LAB (OUTPATIENT)
Dept: LAB | Facility: LAB | Age: 83
End: 2023-10-17
Payer: MEDICARE

## 2023-10-17 DIAGNOSIS — I10 ESSENTIAL (PRIMARY) HYPERTENSION: ICD-10-CM

## 2023-10-17 DIAGNOSIS — I48.11 LONGSTANDING PERSISTENT ATRIAL FIBRILLATION (MULTI): ICD-10-CM

## 2023-10-17 LAB
ANION GAP SERPL CALC-SCNC: 13 MMOL/L (ref 10–20)
BUN SERPL-MCNC: 14 MG/DL (ref 6–23)
CALCIUM SERPL-MCNC: 9.6 MG/DL (ref 8.6–10.3)
CHLORIDE SERPL-SCNC: 93 MMOL/L (ref 98–107)
CO2 SERPL-SCNC: 30 MMOL/L (ref 21–32)
CREAT SERPL-MCNC: 1.01 MG/DL (ref 0.5–1.05)
DIGOXIN SERPL-MCNC: 0.69 NG/ML (ref 0.8–?)
GFR SERPL CREATININE-BSD FRML MDRD: 55 ML/MIN/1.73M*2
GLUCOSE SERPL-MCNC: 155 MG/DL (ref 74–99)
POTASSIUM SERPL-SCNC: 4.4 MMOL/L (ref 3.5–5.3)
SODIUM SERPL-SCNC: 132 MMOL/L (ref 136–145)

## 2023-10-17 PROCEDURE — 36415 COLL VENOUS BLD VENIPUNCTURE: CPT

## 2023-10-21 DIAGNOSIS — E11.65 TYPE 2 DIABETES MELLITUS WITH HYPERGLYCEMIA, WITHOUT LONG-TERM CURRENT USE OF INSULIN (MULTI): ICD-10-CM

## 2023-10-23 ENCOUNTER — HOSPITAL ENCOUNTER (OUTPATIENT)
Dept: RADIOLOGY | Facility: HOSPITAL | Age: 83
Discharge: HOME | End: 2023-10-23
Payer: MEDICARE

## 2023-10-23 DIAGNOSIS — R92.8 ABNORMAL MAMMOGRAM: ICD-10-CM

## 2023-10-23 PROCEDURE — 77066 DX MAMMO INCL CAD BI: CPT

## 2023-10-23 PROCEDURE — 77066 DX MAMMO INCL CAD BI: CPT | Performed by: RADIOLOGY

## 2023-10-23 RX ORDER — SITAGLIPTIN 100 MG/1
100 TABLET, FILM COATED ORAL DAILY
Qty: 90 TABLET | Refills: 1 | Status: SHIPPED | OUTPATIENT
Start: 2023-10-23 | End: 2023-11-07 | Stop reason: ALTCHOICE

## 2023-11-07 ENCOUNTER — OFFICE VISIT (OUTPATIENT)
Dept: PRIMARY CARE | Facility: CLINIC | Age: 83
End: 2023-11-07
Payer: MEDICARE

## 2023-11-07 VITALS
TEMPERATURE: 96.2 F | OXYGEN SATURATION: 98 % | BODY MASS INDEX: 19.63 KG/M2 | SYSTOLIC BLOOD PRESSURE: 154 MMHG | WEIGHT: 107.2 LBS | HEART RATE: 74 BPM | DIASTOLIC BLOOD PRESSURE: 62 MMHG

## 2023-11-07 DIAGNOSIS — I35.1 NONRHEUMATIC AORTIC (VALVE) INSUFFICIENCY: ICD-10-CM

## 2023-11-07 DIAGNOSIS — I10 ESSENTIAL (PRIMARY) HYPERTENSION: ICD-10-CM

## 2023-11-07 DIAGNOSIS — R60.0 BILATERAL EDEMA OF LOWER EXTREMITY: ICD-10-CM

## 2023-11-07 DIAGNOSIS — E78.00 HYPERCHOLESTEROLEMIA: ICD-10-CM

## 2023-11-07 DIAGNOSIS — E11.9 DIABETES MELLITUS TYPE 2, NONINSULIN DEPENDENT (MULTI): ICD-10-CM

## 2023-11-07 DIAGNOSIS — I10 HYPERTENSION, UNCONTROLLED: ICD-10-CM

## 2023-11-07 DIAGNOSIS — J32.9 CHRONIC SINUSITIS, UNSPECIFIED LOCATION: Primary | ICD-10-CM

## 2023-11-07 DIAGNOSIS — R92.8 ABNORMAL MAMMOGRAM: ICD-10-CM

## 2023-11-07 LAB
POC FINGERSTICK BLOOD GLUCOSE: 142 MG/DL (ref 70–100)
POC HEMOGLOBIN A1C: 6.9 % (ref 4.2–6.5)

## 2023-11-07 PROCEDURE — 82962 GLUCOSE BLOOD TEST: CPT | Performed by: INTERNAL MEDICINE

## 2023-11-07 PROCEDURE — 3077F SYST BP >= 140 MM HG: CPT | Performed by: INTERNAL MEDICINE

## 2023-11-07 PROCEDURE — 1036F TOBACCO NON-USER: CPT | Performed by: INTERNAL MEDICINE

## 2023-11-07 PROCEDURE — 83036 HEMOGLOBIN GLYCOSYLATED A1C: CPT | Performed by: INTERNAL MEDICINE

## 2023-11-07 PROCEDURE — 1160F RVW MEDS BY RX/DR IN RCRD: CPT | Performed by: INTERNAL MEDICINE

## 2023-11-07 PROCEDURE — 99214 OFFICE O/P EST MOD 30 MIN: CPT | Performed by: INTERNAL MEDICINE

## 2023-11-07 PROCEDURE — 3078F DIAST BP <80 MM HG: CPT | Performed by: INTERNAL MEDICINE

## 2023-11-07 PROCEDURE — 1159F MED LIST DOCD IN RCRD: CPT | Performed by: INTERNAL MEDICINE

## 2023-11-07 RX ORDER — LINAGLIPTIN 5 MG/1
5 TABLET, FILM COATED ORAL DAILY
Qty: 90 TABLET | Refills: 3 | Status: SHIPPED | OUTPATIENT
Start: 2023-11-07 | End: 2024-11-06

## 2023-11-07 RX ORDER — FUROSEMIDE 20 MG/1
20 TABLET ORAL DAILY
Qty: 90 TABLET | Refills: 1 | Status: SHIPPED | OUTPATIENT
Start: 2023-11-07 | End: 2024-02-26 | Stop reason: SDUPTHER

## 2023-11-07 RX ORDER — AMLODIPINE BESYLATE 5 MG/1
5 TABLET ORAL DAILY
Qty: 90 TABLET | Refills: 1 | Status: SHIPPED | OUTPATIENT
Start: 2023-11-07 | End: 2024-02-26 | Stop reason: SDUPTHER

## 2023-11-07 RX ORDER — MUPIROCIN CALCIUM 20 MG/G
1 CREAM TOPICAL SEE ADMIN INSTRUCTIONS
Qty: 30 G | Refills: 0 | Status: SHIPPED | OUTPATIENT
Start: 2023-11-07

## 2023-11-07 RX ORDER — LISINOPRIL 20 MG/1
20 TABLET ORAL DAILY
Qty: 90 TABLET | Refills: 1 | Status: SHIPPED | OUTPATIENT
Start: 2023-11-07 | End: 2024-02-26 | Stop reason: SDUPTHER

## 2023-11-07 RX ORDER — LOVASTATIN 20 MG/1
20 TABLET ORAL DAILY
Qty: 90 TABLET | Refills: 1 | Status: SHIPPED | OUTPATIENT
Start: 2023-11-07 | End: 2024-02-26 | Stop reason: SDUPTHER

## 2023-11-07 ASSESSMENT — ENCOUNTER SYMPTOMS
BRUISES/BLEEDS EASILY: 0
FATIGUE: 0
ARTHRALGIAS: 0
SINUS PAIN: 0
WHEEZING: 0
ABDOMINAL PAIN: 0
PALPITATIONS: 0
DIFFICULTY URINATING: 0
SORE THROAT: 0
DIARRHEA: 0
UNEXPECTED WEIGHT CHANGE: 0
DIZZINESS: 0
HEADACHES: 0
HYPERTENSION: 1
BLOOD IN STOOL: 0
FEVER: 0
COUGH: 0

## 2023-11-07 NOTE — PROGRESS NOTES
Subjective   Patient ID: Sofie Ulloa is a 83 y.o. female who presents for Hypertension, Diabetes, Results, and Med Refill (Tradjenta 5mg is preferred by insurance, ).    - Diabetes doing much better, hemoglobin A1c 6.9 random sugar controlled patient insurance does not cover Januvia may switch to Tradjenta 5 mg daily new prescription provided did not tolerate metformin before  -Chronic sinusitis history of sinus surgery patient takes mupropion cream on sinus rinse-and use it daily new prescription provided  - Patient recently diagnosed with with COVID-19 doing much better no residual symptoms  - IBS continue monitoring closely symptoms are controlled now  - CHF compensated  - Chronic hyponatremia under control follow-up BMP  -History of gastric ulcer in remission  -Chronic kidney disease stable avoid any salt maximize medical management increase fluid intake  -Hypertension controlled  - Hypercholesterolemia controlled continue with current medication  -Need to Medicare physical next appointment  - Abnormal mammogram, needs to follow-up left diagnostic mammogram in 6 months arrange in April 2024  Follow-up 3 months Medicare physical    Hypertension  Pertinent negatives include no chest pain, headaches or palpitations.   Diabetes  Pertinent negatives for hypoglycemia include no dizziness or headaches. Pertinent negatives for diabetes include no chest pain and no fatigue.   Med Refill  Associated symptoms include congestion. Pertinent negatives include no abdominal pain, arthralgias, chest pain, coughing, fatigue, fever, headaches, rash or sore throat.          Review of Systems   Constitutional:  Negative for fatigue, fever and unexpected weight change.   HENT:  Positive for congestion. Negative for ear discharge, ear pain, mouth sores, sinus pain and sore throat.    Eyes:  Negative for visual disturbance.   Respiratory:  Negative for cough and wheezing.    Cardiovascular:  Negative for chest pain, palpitations and  leg swelling.   Gastrointestinal:  Negative for abdominal pain, blood in stool and diarrhea.   Genitourinary:  Negative for difficulty urinating.   Musculoskeletal:  Negative for arthralgias.   Skin:  Negative for rash.   Neurological:  Negative for dizziness and headaches.   Hematological:  Does not bruise/bleed easily.   Psychiatric/Behavioral:  Negative for behavioral problems.    All other systems reviewed and are negative.      Objective   Lab Results   Component Value Date    HGBA1C 6.9 (A) 11/07/2023      /62   Pulse 74   Temp 35.7 °C (96.2 °F)   Wt 48.6 kg (107 lb 3.2 oz)   SpO2 98%   BMI 19.63 kg/m²   Lab Results   Component Value Date    WBC 10.1 02/16/2023    HGB 12.4 02/16/2023    HCT 39.5 02/16/2023     02/16/2023    CHOL 118 08/12/2022    TRIG 130 08/12/2022    HDL 40.0 08/12/2022    ALT 7 09/26/2022    AST 16 09/26/2022     (L) 10/17/2023    K 4.4 10/17/2023    CL 93 (L) 10/17/2023    CREATININE 1.01 10/17/2023    BUN 14 10/17/2023    CO2 30 10/17/2023    TSH 1.41 08/12/2022    INR 1.5 (H) 12/09/2019    HGBA1C 6.9 (A) 11/07/2023     par   Physical Exam  Vitals and nursing note reviewed.   Constitutional:       Appearance: Normal appearance.   HENT:      Head: Normocephalic.      Nose: Nose normal.   Eyes:      Conjunctiva/sclera: Conjunctivae normal.      Pupils: Pupils are equal, round, and reactive to light.   Cardiovascular:      Rate and Rhythm: Regular rhythm.   Pulmonary:      Effort: Pulmonary effort is normal.      Breath sounds: Normal breath sounds.   Abdominal:      General: Abdomen is flat.      Palpations: Abdomen is soft.   Musculoskeletal:      Cervical back: Neck supple.   Skin:     General: Skin is warm.   Neurological:      General: No focal deficit present.      Mental Status: She is oriented to person, place, and time.   Psychiatric:         Mood and Affect: Mood normal.         Assessment/Plan   Sofie was seen today for hypertension, diabetes, results and  med refill.  Diagnoses and all orders for this visit:  Chronic sinusitis, unspecified location (Primary)  -     mupirocin (Bactroban) 2 % cream; Apply 1 Application topically see administration instructions. Apply as directed by physician  Diabetes mellitus type 2, noninsulin dependent (CMS/HCC)  -     POCT fingerstick glucose manually resulted  -     POCT glycosylated hemoglobin (Hb A1C) manually resulted  -     linaGLIPtin (Tradjenta) 5 mg tablet; Take 1 tablet (5 mg) by mouth once daily.  Essential (primary) hypertension  -     amLODIPine (Norvasc) 5 mg tablet; Take 1 tablet (5 mg) by mouth once daily.  Nonrheumatic aortic (valve) insufficiency  Bilateral edema of lower extremity  -     furosemide (Lasix) 20 mg tablet; Take 1 tablet (20 mg) by mouth once daily.  Hypertension, uncontrolled  -     lisinopril 20 mg tablet; Take 1 tablet (20 mg) by mouth once daily.  Hypercholesterolemia  -     lovastatin (Mevacor) 20 mg tablet; Take 1 tablet (20 mg) by mouth once daily.  Abnormal mammogram  Other orders  -     Follow Up In Primary Care - Established  -     Follow Up In Primary Care - Medicare Annual; Future   - Diabetes doing much better, hemoglobin A1c 6.9 random sugar controlled patient insurance does not cover Januvia may switch to Tradjenta 5 mg daily new prescription provided did not tolerate metformin before  -Chronic sinusitis history of sinus surgery patient takes mupropion cream on sinus rinse-and use it daily new prescription provided  - Patient recently diagnosed with with COVID-19 doing much better no residual symptoms  - IBS continue monitoring closely symptoms are controlled now  - CHF compensated  - Chronic hyponatremia under control follow-up BMP  -History of gastric ulcer in remission  -Chronic kidney disease stable avoid any salt maximize medical management increase fluid intake  -Hypertension controlled  - Hypercholesterolemia controlled continue with current medication  -Need to Medicare physical  next appointment  - Abnormal mammogram, needs to follow-up left diagnostic mammogram in 6 months arrange in April 2024  Follow-up 3 months Medicare physical

## 2023-12-11 DIAGNOSIS — E11.9 TYPE 2 DIABETES MELLITUS WITHOUT COMPLICATIONS (MULTI): ICD-10-CM

## 2023-12-12 RX ORDER — LANCETS
EACH MISCELLANEOUS
Qty: 200 EACH | Refills: 1 | Status: SHIPPED | OUTPATIENT
Start: 2023-12-12 | End: 2024-02-26 | Stop reason: SDUPTHER

## 2024-02-03 DIAGNOSIS — E11.9 TYPE 2 DIABETES MELLITUS WITHOUT COMPLICATIONS (MULTI): ICD-10-CM

## 2024-02-06 ENCOUNTER — OFFICE VISIT (OUTPATIENT)
Dept: PRIMARY CARE | Facility: CLINIC | Age: 84
End: 2024-02-06
Payer: MEDICARE

## 2024-02-06 VITALS
OXYGEN SATURATION: 98 % | SYSTOLIC BLOOD PRESSURE: 148 MMHG | TEMPERATURE: 96.7 F | WEIGHT: 110 LBS | DIASTOLIC BLOOD PRESSURE: 62 MMHG | HEART RATE: 65 BPM | BODY MASS INDEX: 20.14 KG/M2

## 2024-02-06 DIAGNOSIS — I50.9 HEART FAILURE, UNSPECIFIED HF CHRONICITY, UNSPECIFIED HEART FAILURE TYPE (MULTI): ICD-10-CM

## 2024-02-06 DIAGNOSIS — E11.29 PROTEINURIA DUE TO TYPE 2 DIABETES MELLITUS (MULTI): ICD-10-CM

## 2024-02-06 DIAGNOSIS — I48.20 CHRONIC ATRIAL FIBRILLATION, UNSPECIFIED (MULTI): ICD-10-CM

## 2024-02-06 DIAGNOSIS — E46 PROTEIN-CALORIE MALNUTRITION, UNSPECIFIED SEVERITY (MULTI): ICD-10-CM

## 2024-02-06 DIAGNOSIS — N18.31 STAGE 3A CHRONIC KIDNEY DISEASE (MULTI): ICD-10-CM

## 2024-02-06 DIAGNOSIS — I10 HYPERTENSION, UNCONTROLLED: ICD-10-CM

## 2024-02-06 DIAGNOSIS — J01.00 ACUTE MAXILLARY SINUSITIS, RECURRENCE NOT SPECIFIED: ICD-10-CM

## 2024-02-06 DIAGNOSIS — J32.9 SINUSITIS, UNSPECIFIED CHRONICITY, UNSPECIFIED LOCATION: ICD-10-CM

## 2024-02-06 DIAGNOSIS — I25.119 ATHEROSCLEROSIS OF NATIVE CORONARY ARTERY OF NATIVE HEART WITH ANGINA PECTORIS (CMS-HCC): Primary | ICD-10-CM

## 2024-02-06 DIAGNOSIS — R80.9 PROTEINURIA DUE TO TYPE 2 DIABETES MELLITUS (MULTI): ICD-10-CM

## 2024-02-06 DIAGNOSIS — K58.9 IRRITABLE BOWEL SYNDROME, UNSPECIFIED TYPE: ICD-10-CM

## 2024-02-06 PROCEDURE — 1159F MED LIST DOCD IN RCRD: CPT | Performed by: INTERNAL MEDICINE

## 2024-02-06 PROCEDURE — 1160F RVW MEDS BY RX/DR IN RCRD: CPT | Performed by: INTERNAL MEDICINE

## 2024-02-06 PROCEDURE — 3077F SYST BP >= 140 MM HG: CPT | Performed by: INTERNAL MEDICINE

## 2024-02-06 PROCEDURE — 3078F DIAST BP <80 MM HG: CPT | Performed by: INTERNAL MEDICINE

## 2024-02-06 PROCEDURE — 1036F TOBACCO NON-USER: CPT | Performed by: INTERNAL MEDICINE

## 2024-02-06 PROCEDURE — 1157F ADVNC CARE PLAN IN RCRD: CPT | Performed by: INTERNAL MEDICINE

## 2024-02-06 PROCEDURE — 99214 OFFICE O/P EST MOD 30 MIN: CPT | Performed by: INTERNAL MEDICINE

## 2024-02-06 RX ORDER — AZELASTINE 1 MG/ML
1 SPRAY, METERED NASAL 2 TIMES DAILY
Qty: 30 ML | Refills: 12 | Status: SHIPPED | OUTPATIENT
Start: 2024-02-06 | End: 2025-02-05

## 2024-02-06 RX ORDER — BLOOD SUGAR DIAGNOSTIC
STRIP MISCELLANEOUS
Qty: 100 STRIP | Refills: 1 | Status: SHIPPED | OUTPATIENT
Start: 2024-02-06

## 2024-02-06 RX ORDER — DOXYCYCLINE 100 MG/1
100 CAPSULE ORAL 2 TIMES DAILY
Qty: 20 CAPSULE | Refills: 0 | Status: SHIPPED | OUTPATIENT
Start: 2024-02-06 | End: 2024-02-16

## 2024-02-06 ASSESSMENT — ENCOUNTER SYMPTOMS
WHEEZING: 0
BLOOD IN STOOL: 0
SORE THROAT: 0
ARTHRALGIAS: 0
PALPITATIONS: 0
HEADACHES: 1
SINUS PAIN: 0
UNEXPECTED WEIGHT CHANGE: 0
DIARRHEA: 0
FATIGUE: 0
FEVER: 0
DIFFICULTY URINATING: 0
BRUISES/BLEEDS EASILY: 0
DIZZINESS: 0
COUGH: 0
ABDOMINAL PAIN: 0

## 2024-02-06 NOTE — PROGRESS NOTES
Subjective   Patient ID: Sofie Ulloa is a 83 y.o. female who presents for Nasal Congestion (Green when blows nose, symptoms all since having COVID), Headache, and watery eyes.    Sinus drainage since she had COVID in November blowing her nose greenish sputum no fever no chills no abdominal pain  -Sinusitis patient to start on Astepro nasal spray  Add antibiotic doxycycline twice a day for 10 days  - Diabetes doing much better, hemoglobin A1c 6.9 r continue with current medication  -Chronic sinusitis history of sinus surgery patient takes mupropion cream on sinus rinse-and use it daily new prescription provided  - IBS continue monitoring closely symptoms are controlled now  - CHF compensated  - Chronic hyponatremia under control follow-up BMP  -History of gastric ulcer in remission  -Chronic kidney disease stable avoid any salt maximize medical management increase fluid intake  -Hypertension controlled  - Hypercholesterolemia controlled continue with current medication  -Need to Medicare physical next appointment  - Abnormal mammogram, needs to follow-up left diagnostic mammogram in 6 months arrange in April 2024  Follow-up Medicare physic      Headache   Pertinent negatives include no abdominal pain, coughing, dizziness, ear pain, fever or sore throat.          Review of Systems   Constitutional:  Negative for fatigue, fever and unexpected weight change.   HENT:  Negative for congestion, ear discharge, ear pain, mouth sores, sinus pain and sore throat.    Eyes:  Negative for visual disturbance.   Respiratory:  Negative for cough and wheezing.    Cardiovascular:  Negative for chest pain, palpitations and leg swelling.   Gastrointestinal:  Negative for abdominal pain, blood in stool and diarrhea.   Genitourinary:  Negative for difficulty urinating.   Musculoskeletal:  Negative for arthralgias.   Skin:  Negative for rash.   Neurological:  Positive for headaches. Negative for dizziness.   Hematological:  Does not  bruise/bleed easily.   Psychiatric/Behavioral:  Negative for behavioral problems.    All other systems reviewed and are negative.      Objective   Lab Results   Component Value Date    HGBA1C 6.9 (A) 11/07/2023      /62   Pulse 65   Temp 35.9 °C (96.7 °F)   Wt 49.9 kg (110 lb)   SpO2 98%   BMI 20.14 kg/m²     Physical Exam  Vitals and nursing note reviewed.   Constitutional:       Appearance: Normal appearance.   HENT:      Head: Normocephalic.      Nose: Nose normal.   Eyes:      Conjunctiva/sclera: Conjunctivae normal.      Pupils: Pupils are equal, round, and reactive to light.   Cardiovascular:      Rate and Rhythm: Regular rhythm.   Pulmonary:      Effort: Pulmonary effort is normal.      Breath sounds: Normal breath sounds.   Abdominal:      General: Abdomen is flat.      Palpations: Abdomen is soft.   Musculoskeletal:      Cervical back: Neck supple.   Skin:     General: Skin is warm.   Neurological:      General: No focal deficit present.      Mental Status: She is oriented to person, place, and time.   Psychiatric:         Mood and Affect: Mood normal.         Assessment/Plan   Sofie was seen today for nasal congestion, headache and watery eyes.  Diagnoses and all orders for this visit:  Atherosclerosis of native coronary artery of native heart with angina pectoris (CMS/[facility]) (Primary)  Sinusitis, unspecified chronicity, unspecified location  Protein-calorie malnutrition, unspecified severity (CMS/HCC)  Heart failure, unspecified HF chronicity, unspecified heart failure type (CMS/HCC)  Chronic atrial fibrillation, unspecified (CMS/HCC)  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 (CMS/[facility])  Stage 3a chronic kidney disease (CMS/HCC)  Acute maxillary sinusitis, recurrence not specified  -     doxycycline (Vibramycin) 100 mg capsule; Take 1 capsule (100 mg) by mouth 2 times a day for 10 days. Take with at least 8 ounces (large glass) of water, do not lie down for 30 minutes after  -     azelastine  (Astelin) 137 mcg (0.1 %) nasal spray; Administer 1 spray into each nostril 2 times a day. Use in each nostril as directed  Irritable bowel syndrome, unspecified type  Hypertension, uncontrolled   Sinus drainage since she had COVID in November blowing her nose greenish sputum no fever no chills no abdominal pain  -Sinusitis patient to start on Astepro nasal spray  Add antibiotic doxycycline twice a day for 10 days  - Diabetes doing much better, hemoglobin A1c 6.9 r continue with current medication  -Chronic sinusitis history of sinus surgery patient takes mupropion cream on sinus rinse-and use it daily new prescription provided  - IBS continue monitoring closely symptoms are controlled now  - CHF compensated  - Chronic hyponatremia under control follow-up BMP  -History of gastric ulcer in remission  -Chronic kidney disease stable avoid any salt maximize medical management increase fluid intake  -Hypertension controlled  - Hypercholesterolemia controlled continue with current medication  -Need to Medicare physical next appointment  - Abnormal mammogram, needs to follow-up left diagnostic mammogram in 6 months arrange in April 2024  Follow-up Medicare physic

## 2024-02-26 ENCOUNTER — OFFICE VISIT (OUTPATIENT)
Dept: PRIMARY CARE | Facility: CLINIC | Age: 84
End: 2024-02-26
Payer: MEDICARE

## 2024-02-26 VITALS
HEIGHT: 62 IN | BODY MASS INDEX: 20.35 KG/M2 | DIASTOLIC BLOOD PRESSURE: 75 MMHG | OXYGEN SATURATION: 99 % | HEART RATE: 57 BPM | SYSTOLIC BLOOD PRESSURE: 120 MMHG | TEMPERATURE: 96.2 F | WEIGHT: 110.6 LBS

## 2024-02-26 DIAGNOSIS — E11.65 TYPE 2 DIABETES MELLITUS WITH HYPERGLYCEMIA, WITHOUT LONG-TERM CURRENT USE OF INSULIN (MULTI): Primary | ICD-10-CM

## 2024-02-26 DIAGNOSIS — R60.0 BILATERAL EDEMA OF LOWER EXTREMITY: ICD-10-CM

## 2024-02-26 DIAGNOSIS — I10 ESSENTIAL (PRIMARY) HYPERTENSION: ICD-10-CM

## 2024-02-26 DIAGNOSIS — Z00.00 ROUTINE GENERAL MEDICAL EXAMINATION AT HEALTH CARE FACILITY: ICD-10-CM

## 2024-02-26 DIAGNOSIS — I10 HYPERTENSION, UNSPECIFIED TYPE: ICD-10-CM

## 2024-02-26 DIAGNOSIS — Z79.899 HIGH RISK MEDICATION USE: ICD-10-CM

## 2024-02-26 DIAGNOSIS — E11.9 TYPE 2 DIABETES MELLITUS WITHOUT COMPLICATION, WITHOUT LONG-TERM CURRENT USE OF INSULIN (MULTI): ICD-10-CM

## 2024-02-26 DIAGNOSIS — I25.119 ATHEROSCLEROSIS OF NATIVE CORONARY ARTERY OF NATIVE HEART WITH ANGINA PECTORIS (CMS-HCC): ICD-10-CM

## 2024-02-26 DIAGNOSIS — N18.31 STAGE 3A CHRONIC KIDNEY DISEASE (MULTI): ICD-10-CM

## 2024-02-26 DIAGNOSIS — E55.9 VITAMIN D DEFICIENCY, UNSPECIFIED: ICD-10-CM

## 2024-02-26 DIAGNOSIS — I10 HYPERTENSION, UNCONTROLLED: ICD-10-CM

## 2024-02-26 DIAGNOSIS — E53.8 VITAMIN B12 DEFICIENCY: ICD-10-CM

## 2024-02-26 DIAGNOSIS — E78.00 HYPERCHOLESTEREMIA: ICD-10-CM

## 2024-02-26 DIAGNOSIS — E78.00 HYPERCHOLESTEROLEMIA: ICD-10-CM

## 2024-02-26 DIAGNOSIS — R53.83 OTHER FATIGUE: ICD-10-CM

## 2024-02-26 PROCEDURE — 99397 PER PM REEVAL EST PAT 65+ YR: CPT | Performed by: INTERNAL MEDICINE

## 2024-02-26 PROCEDURE — 1123F ACP DISCUSS/DSCN MKR DOCD: CPT | Performed by: INTERNAL MEDICINE

## 2024-02-26 PROCEDURE — 1157F ADVNC CARE PLAN IN RCRD: CPT | Performed by: INTERNAL MEDICINE

## 2024-02-26 PROCEDURE — 1036F TOBACCO NON-USER: CPT | Performed by: INTERNAL MEDICINE

## 2024-02-26 PROCEDURE — 1170F FXNL STATUS ASSESSED: CPT | Performed by: INTERNAL MEDICINE

## 2024-02-26 PROCEDURE — 3074F SYST BP LT 130 MM HG: CPT | Performed by: INTERNAL MEDICINE

## 2024-02-26 PROCEDURE — 1159F MED LIST DOCD IN RCRD: CPT | Performed by: INTERNAL MEDICINE

## 2024-02-26 PROCEDURE — 1160F RVW MEDS BY RX/DR IN RCRD: CPT | Performed by: INTERNAL MEDICINE

## 2024-02-26 PROCEDURE — 3077F SYST BP >= 140 MM HG: CPT | Performed by: INTERNAL MEDICINE

## 2024-02-26 PROCEDURE — G0439 PPPS, SUBSEQ VISIT: HCPCS | Performed by: INTERNAL MEDICINE

## 2024-02-26 PROCEDURE — 3078F DIAST BP <80 MM HG: CPT | Performed by: INTERNAL MEDICINE

## 2024-02-26 PROCEDURE — 1158F ADVNC CARE PLAN TLK DOCD: CPT | Performed by: INTERNAL MEDICINE

## 2024-02-26 RX ORDER — LANCETS
EACH MISCELLANEOUS
Qty: 200 EACH | Refills: 1 | Status: SHIPPED | OUTPATIENT
Start: 2024-02-26

## 2024-02-26 RX ORDER — FUROSEMIDE 20 MG/1
20 TABLET ORAL DAILY
Qty: 90 TABLET | Refills: 1 | Status: SHIPPED | OUTPATIENT
Start: 2024-02-26 | End: 2024-05-22 | Stop reason: SDUPTHER

## 2024-02-26 RX ORDER — LISINOPRIL 20 MG/1
20 TABLET ORAL DAILY
Qty: 90 TABLET | Refills: 1 | Status: SHIPPED | OUTPATIENT
Start: 2024-02-26 | End: 2024-05-22 | Stop reason: SDUPTHER

## 2024-02-26 RX ORDER — LOVASTATIN 20 MG/1
20 TABLET ORAL DAILY
Qty: 90 TABLET | Refills: 1 | Status: SHIPPED | OUTPATIENT
Start: 2024-02-26 | End: 2024-05-22 | Stop reason: SDUPTHER

## 2024-02-26 RX ORDER — AMLODIPINE BESYLATE 5 MG/1
5 TABLET ORAL DAILY
Qty: 90 TABLET | Refills: 1 | Status: SHIPPED | OUTPATIENT
Start: 2024-02-26 | End: 2024-05-22 | Stop reason: SDUPTHER

## 2024-02-26 ASSESSMENT — ENCOUNTER SYMPTOMS
FEVER: 0
BLOOD IN STOOL: 0
WHEEZING: 0
ABDOMINAL PAIN: 0
FATIGUE: 0
HYPERTENSION: 1
PALPITATIONS: 0
DIARRHEA: 0
UNEXPECTED WEIGHT CHANGE: 0
HEADACHES: 0
DIZZINESS: 0
SINUS PAIN: 0
DIFFICULTY URINATING: 0
OCCASIONAL FEELINGS OF UNSTEADINESS: 0
LOSS OF SENSATION IN FEET: 0
BRUISES/BLEEDS EASILY: 0
SORE THROAT: 0
ARTHRALGIAS: 0
DEPRESSION: 0
COUGH: 0

## 2024-02-26 ASSESSMENT — PATIENT HEALTH QUESTIONNAIRE - PHQ9
1. LITTLE INTEREST OR PLEASURE IN DOING THINGS: NOT AT ALL
SUM OF ALL RESPONSES TO PHQ9 QUESTIONS 1 AND 2: 0
2. FEELING DOWN, DEPRESSED OR HOPELESS: NOT AT ALL

## 2024-02-26 ASSESSMENT — ACTIVITIES OF DAILY LIVING (ADL)
TAKING_MEDICATION: INDEPENDENT
DOING_HOUSEWORK: INDEPENDENT
MANAGING_FINANCES: INDEPENDENT
GROCERY_SHOPPING: INDEPENDENT
DRESSING: INDEPENDENT
BATHING: INDEPENDENT

## 2024-02-26 NOTE — PROGRESS NOTES
Subjective   Reason for Visit: Sofie Ulloa is an 83 y.o. female here for a Medicare Wellness visit.     Past Medical, Surgical, and Family History reviewed and updated in chart.    Reviewed all medications by prescribing practitioner or clinical pharmacist (such as prescriptions, OTCs, herbal therapies and supplements) and documented in the medical record.    Annual preventive visit  - Vaccinations reviewed and up-to-date  - Screen for colon cancer  Patient age  - Screening mammogram obtained in October 2020 3 repeat in 1 year doing well otherwise no complaints  -Screen for depression negative  - Advance of directive reviewed Medicare screening reviewed    Follow-up  -Needs to complete blood work  - Diabetes doing much better, hemoglobin A1c 6.9 r continue with current medication  Needs to proceed with diabetic eye exam  -Chronic sinusitis history of sinus surgery patient takes mupropion cream on sinus rinse-and use it daily new prescription provided  - IBS continue monitoring closely symptoms are controlled now  - CHF compensated  - Chronic hyponatremia under control follow-up BMP  -History of gastric ulcer in remission  -Chronic kidney disease stable avoid any salt maximize medical management increase fluid intake  -Hypertension controlled  - Hypercholesterolemia controlled continue with current medication  -Need to Medicare physical next appointment  -Follow-up 3 months             Patient Care Team:  Eve Gonzalez MD as PCP - General (Internal Medicine)  Eve Gonzalez MD as PCP - [employer] PCP  Eve Gonzalez MD     Review of Systems   Constitutional:  Negative for fatigue, fever and unexpected weight change.   HENT:  Negative for congestion, ear discharge, ear pain, mouth sores, sinus pain and sore throat.    Eyes:  Negative for visual disturbance.   Respiratory:  Negative for cough and wheezing.    Cardiovascular:  Negative for chest pain, palpitations and leg swelling.  "  Gastrointestinal:  Negative for abdominal pain, blood in stool and diarrhea.   Genitourinary:  Negative for difficulty urinating.   Musculoskeletal:  Negative for arthralgias.   Skin:  Negative for rash.   Neurological:  Negative for dizziness and headaches.   Hematological:  Does not bruise/bleed easily.   Psychiatric/Behavioral:  Negative for behavioral problems.    All other systems reviewed and are negative.      Objective   Vitals:  /75   Pulse 57   Temp 35.7 °C (96.2 °F)   Ht 1.575 m (5' 2\")   Wt 50.2 kg (110 lb 9.6 oz)   SpO2 99%   BMI 20.23 kg/m²     Lab Results   Component Value Date    WBC 10.1 02/16/2023    HGB 12.4 02/16/2023    HCT 39.5 02/16/2023     02/16/2023    CHOL 118 08/12/2022    TRIG 130 08/12/2022    HDL 40.0 08/12/2022    ALT 7 09/26/2022    AST 16 09/26/2022     (L) 10/17/2023    K 4.4 10/17/2023    CL 93 (L) 10/17/2023    CREATININE 1.01 10/17/2023    BUN 14 10/17/2023    CO2 30 10/17/2023    TSH 1.41 08/12/2022    INR 1.5 (H) 12/09/2019    HGBA1C 6.9 (A) 11/07/2023     par   Physical Exam  Vitals and nursing note reviewed.   Constitutional:       Appearance: Normal appearance.   HENT:      Head: Normocephalic.      Nose: Nose normal.   Eyes:      Conjunctiva/sclera: Conjunctivae normal.      Pupils: Pupils are equal, round, and reactive to light.   Cardiovascular:      Rate and Rhythm: Regular rhythm.   Pulmonary:      Effort: Pulmonary effort is normal.      Breath sounds: Normal breath sounds.   Abdominal:      General: Abdomen is flat.      Palpations: Abdomen is soft.   Musculoskeletal:      Cervical back: Neck supple.   Skin:     General: Skin is warm.   Neurological:      General: No focal deficit present.      Mental Status: She is oriented to person, place, and time.   Psychiatric:         Mood and Affect: Mood normal.         Assessment/Plan   Problem List Items Addressed This Visit       Hypercholesterolemia    Relevant Medications    lovastatin " (Mevacor) 20 mg tablet    Hypertension, uncontrolled    Relevant Medications    lisinopril 20 mg tablet    Stage 3a chronic kidney disease (CMS/[facility])    Bilateral edema of lower extremity    Relevant Medications    furosemide (Lasix) 20 mg tablet    Atherosclerosis of native coronary artery of native heart with angina pectoris (CMS/[facility])    Relevant Medications    amLODIPine (Norvasc) 5 mg tablet     Other Visit Diagnoses       Type 2 diabetes mellitus with hyperglycemia, without long-term current use of insulin (CMS/HCC)    -  Primary    Essential (primary) hypertension        Relevant Medications    amLODIPine (Norvasc) 5 mg tablet    Routine general medical examination at health care facility        Relevant Medications    diphth,pertus,acell,,tetanus (BoostRIX) 2.5-8-5 Lf-mcg-Lf/0.5mL injection    High risk medication use        Relevant Orders    CBC and Auto Differential    Hypertension, unspecified type        Relevant Orders    Comprehensive Metabolic Panel    Hypercholesteremia        Relevant Orders    Lipid Panel    Other fatigue        Relevant Orders    TSH with reflex to Free T4 if abnormal    Vitamin B12 deficiency        Relevant Orders    Vitamin B12    Vitamin D deficiency, unspecified        Relevant Orders    Vitamin D 25-Hydroxy,Total (for eval of Vitamin D levels)    Type 2 diabetes mellitus without complication, without long-term current use of insulin (CMS/[facility])        Relevant Medications    Accu-Chek Softclix Lancets misc    Other Relevant Orders    Referral to Ophthalmology          Annual preventive visit  - Vaccinations reviewed and up-to-date  - Screen for colon cancer  Patient age  - Screening mammogram obtained in October 2020 3 repeat in 1 year doing well otherwise no complaints  -Screen for depression negative  - Advance of directive reviewed Medicare screening reviewed    Follow-up  -Needs to complete blood work  - Diabetes doing much better, hemoglobin A1c 6.9 r continue with current  medication  Needs to proceed with diabetic eye exam  -Chronic sinusitis history of sinus surgery patient takes mupropion cream on sinus rinse-and use it daily new prescription provided  - IBS continue monitoring closely symptoms are controlled now  - CHF compensated  - Chronic hyponatremia under control follow-up BMP  -History of gastric ulcer in remission  -Chronic kidney disease stable avoid any salt maximize medical management increase fluid intake  -Hypertension controlled  - Hypercholesterolemia controlled continue with current medication  -Need to Medicare physical next appointment  -Follow-up 3 months

## 2024-02-26 NOTE — PROGRESS NOTES
"Subjective   Patient ID: Sofie Ulloa is a 83 y.o. female who presents for Medicare Annual Wellness Visit Subsequent, Med Refill, Hypertension, Hyperlipidemia, and Diabetes.    Med Refill    Hypertension    Hyperlipidemia    Diabetes           Review of Systems    Objective   Lab Results   Component Value Date    HGBA1C 6.9 (A) 11/07/2023      /62   Pulse 57   Temp 35.7 °C (96.2 °F)   Ht 1.575 m (5' 2\")   Wt 50.2 kg (110 lb 9.6 oz)   SpO2 99%   BMI 20.23 kg/m²     Physical Exam    Assessment/Plan   Sofie was seen today for medicare annual wellness visit subsequent, med refill, hypertension, hyperlipidemia and diabetes.  Diagnoses and all orders for this visit:  Type 2 diabetes mellitus without complications (CMS/[facility])  -     Accu-Chek Softclix Lancets misc; Check blood sugar twice a day dx: e11.9  Essential (primary) hypertension  -     amLODIPine (Norvasc) 5 mg tablet; Take 1 tablet (5 mg) by mouth once daily.  Bilateral edema of lower extremity  -     furosemide (Lasix) 20 mg tablet; Take 1 tablet (20 mg) by mouth once daily.  Hypertension, uncontrolled  -     lisinopril 20 mg tablet; Take 1 tablet (20 mg) by mouth once daily.  Hypercholesterolemia  -     lovastatin (Mevacor) 20 mg tablet; Take 1 tablet (20 mg) by mouth once daily.  Other orders  -     Follow Up In Primary Care - Medicare Annual     "

## 2024-03-11 ENCOUNTER — TELEPHONE (OUTPATIENT)
Dept: PRIMARY CARE | Facility: CLINIC | Age: 84
End: 2024-03-11
Payer: MEDICARE

## 2024-03-11 DIAGNOSIS — J32.9 SINUSITIS, UNSPECIFIED CHRONICITY, UNSPECIFIED LOCATION: ICD-10-CM

## 2024-03-11 RX ORDER — DOXYCYCLINE 100 MG/1
100 CAPSULE ORAL 2 TIMES DAILY
COMMUNITY
End: 2024-03-11 | Stop reason: SDUPTHER

## 2024-03-11 RX ORDER — DOXYCYCLINE 100 MG/1
100 CAPSULE ORAL 2 TIMES DAILY
Qty: 14 CAPSULE | Refills: 0 | Status: SHIPPED | OUTPATIENT
Start: 2024-03-11

## 2024-03-11 NOTE — TELEPHONE ENCOUNTER
Patient given Doxy 2/8 for sinus infection, cleared up and seemed fine when she had her appt 2/26, however last couple of days she feels like it is back and would like more Doxy    Allergies:  Amoxicillin   Losartan   Sulfa (sulfonamide Antibiotics)     Pharmacy:  [facility]

## 2024-05-09 ENCOUNTER — LAB (OUTPATIENT)
Dept: LAB | Facility: LAB | Age: 84
End: 2024-05-09
Payer: MEDICARE

## 2024-05-09 DIAGNOSIS — E78.00 HYPERCHOLESTEREMIA: ICD-10-CM

## 2024-05-09 DIAGNOSIS — I10 HYPERTENSION, UNSPECIFIED TYPE: ICD-10-CM

## 2024-05-09 DIAGNOSIS — E53.8 VITAMIN B12 DEFICIENCY: ICD-10-CM

## 2024-05-09 DIAGNOSIS — Z79.899 HIGH RISK MEDICATION USE: ICD-10-CM

## 2024-05-09 DIAGNOSIS — E55.9 VITAMIN D DEFICIENCY, UNSPECIFIED: ICD-10-CM

## 2024-05-09 DIAGNOSIS — R53.83 OTHER FATIGUE: ICD-10-CM

## 2024-05-09 LAB
25(OH)D3 SERPL-MCNC: 61 NG/ML (ref 30–100)
ALBUMIN SERPL BCP-MCNC: 4.5 G/DL (ref 3.4–5)
ALP SERPL-CCNC: 76 U/L (ref 33–136)
ALT SERPL W P-5'-P-CCNC: 11 U/L (ref 7–45)
ANION GAP SERPL CALC-SCNC: 11 MMOL/L (ref 10–20)
AST SERPL W P-5'-P-CCNC: 17 U/L (ref 9–39)
BASOPHILS # BLD AUTO: 0.07 X10*3/UL (ref 0–0.1)
BASOPHILS NFR BLD AUTO: 0.8 %
BILIRUB SERPL-MCNC: 0.9 MG/DL (ref 0–1.2)
BUN SERPL-MCNC: 15 MG/DL (ref 6–23)
CALCIUM SERPL-MCNC: 10 MG/DL (ref 8.6–10.3)
CHLORIDE SERPL-SCNC: 92 MMOL/L (ref 98–107)
CHOLEST SERPL-MCNC: 108 MG/DL (ref 0–199)
CHOLESTEROL/HDL RATIO: 3.3
CO2 SERPL-SCNC: 28 MMOL/L (ref 21–32)
CREAT SERPL-MCNC: 1.08 MG/DL (ref 0.5–1.05)
EGFRCR SERPLBLD CKD-EPI 2021: 51 ML/MIN/1.73M*2
EOSINOPHIL # BLD AUTO: 0.22 X10*3/UL (ref 0–0.4)
EOSINOPHIL NFR BLD AUTO: 2.6 %
ERYTHROCYTE [DISTWIDTH] IN BLOOD BY AUTOMATED COUNT: 13.3 % (ref 11.5–14.5)
GLUCOSE SERPL-MCNC: 177 MG/DL (ref 74–99)
HCT VFR BLD AUTO: 39.7 % (ref 36–46)
HDLC SERPL-MCNC: 32.8 MG/DL
HGB BLD-MCNC: 13.1 G/DL (ref 12–16)
IMM GRANULOCYTES # BLD AUTO: 0.03 X10*3/UL (ref 0–0.5)
IMM GRANULOCYTES NFR BLD AUTO: 0.4 % (ref 0–0.9)
LDLC SERPL CALC-MCNC: 53 MG/DL
LYMPHOCYTES # BLD AUTO: 1.6 X10*3/UL (ref 0.8–3)
LYMPHOCYTES NFR BLD AUTO: 18.9 %
MCH RBC QN AUTO: 30.8 PG (ref 26–34)
MCHC RBC AUTO-ENTMCNC: 33 G/DL (ref 32–36)
MCV RBC AUTO: 93 FL (ref 80–100)
MONOCYTES # BLD AUTO: 0.95 X10*3/UL (ref 0.05–0.8)
MONOCYTES NFR BLD AUTO: 11.2 %
NEUTROPHILS # BLD AUTO: 5.58 X10*3/UL (ref 1.6–5.5)
NEUTROPHILS NFR BLD AUTO: 66.1 %
NON HDL CHOLESTEROL: 75 MG/DL (ref 0–149)
NRBC BLD-RTO: 0 /100 WBCS (ref 0–0)
PLATELET # BLD AUTO: 201 X10*3/UL (ref 150–450)
POTASSIUM SERPL-SCNC: 4.4 MMOL/L (ref 3.5–5.3)
PROT SERPL-MCNC: 7.8 G/DL (ref 6.4–8.2)
RBC # BLD AUTO: 4.26 X10*6/UL (ref 4–5.2)
SODIUM SERPL-SCNC: 127 MMOL/L (ref 136–145)
TRIGL SERPL-MCNC: 113 MG/DL (ref 0–149)
TSH SERPL-ACNC: 1.68 MIU/L (ref 0.44–3.98)
VIT B12 SERPL-MCNC: 1268 PG/ML (ref 211–911)
VLDL: 23 MG/DL (ref 0–40)
WBC # BLD AUTO: 8.5 X10*3/UL (ref 4.4–11.3)

## 2024-05-09 PROCEDURE — 36415 COLL VENOUS BLD VENIPUNCTURE: CPT

## 2024-05-09 PROCEDURE — 82607 VITAMIN B-12: CPT

## 2024-05-09 PROCEDURE — 82306 VITAMIN D 25 HYDROXY: CPT

## 2024-05-22 ENCOUNTER — OFFICE VISIT (OUTPATIENT)
Dept: PRIMARY CARE | Facility: CLINIC | Age: 84
End: 2024-05-22
Payer: MEDICARE

## 2024-05-22 VITALS
DIASTOLIC BLOOD PRESSURE: 70 MMHG | BODY MASS INDEX: 20.19 KG/M2 | WEIGHT: 110.4 LBS | TEMPERATURE: 96.4 F | OXYGEN SATURATION: 99 % | SYSTOLIC BLOOD PRESSURE: 120 MMHG | HEART RATE: 76 BPM

## 2024-05-22 DIAGNOSIS — N18.31 STAGE 3A CHRONIC KIDNEY DISEASE (MULTI): ICD-10-CM

## 2024-05-22 DIAGNOSIS — R60.0 BILATERAL EDEMA OF LOWER EXTREMITY: ICD-10-CM

## 2024-05-22 DIAGNOSIS — E78.00 HYPERCHOLESTEROLEMIA: ICD-10-CM

## 2024-05-22 DIAGNOSIS — I10 HYPERTENSION, UNCONTROLLED: ICD-10-CM

## 2024-05-22 DIAGNOSIS — R92.8 ABNORMAL MAMMOGRAM: ICD-10-CM

## 2024-05-22 DIAGNOSIS — E11.9 DIABETES MELLITUS TYPE 2, NONINSULIN DEPENDENT (MULTI): Primary | ICD-10-CM

## 2024-05-22 DIAGNOSIS — I10 ESSENTIAL (PRIMARY) HYPERTENSION: ICD-10-CM

## 2024-05-22 LAB
POC FINGERSTICK BLOOD GLUCOSE: 178 MG/DL (ref 70–100)
POC HEMOGLOBIN A1C: 7.5 % (ref 4.2–6.5)

## 2024-05-22 PROCEDURE — 1036F TOBACCO NON-USER: CPT | Performed by: INTERNAL MEDICINE

## 2024-05-22 PROCEDURE — 1159F MED LIST DOCD IN RCRD: CPT | Performed by: INTERNAL MEDICINE

## 2024-05-22 PROCEDURE — 1160F RVW MEDS BY RX/DR IN RCRD: CPT | Performed by: INTERNAL MEDICINE

## 2024-05-22 PROCEDURE — 82962 GLUCOSE BLOOD TEST: CPT | Performed by: INTERNAL MEDICINE

## 2024-05-22 PROCEDURE — 99214 OFFICE O/P EST MOD 30 MIN: CPT | Performed by: INTERNAL MEDICINE

## 2024-05-22 PROCEDURE — 3078F DIAST BP <80 MM HG: CPT | Performed by: INTERNAL MEDICINE

## 2024-05-22 PROCEDURE — 83036 HEMOGLOBIN GLYCOSYLATED A1C: CPT | Performed by: INTERNAL MEDICINE

## 2024-05-22 PROCEDURE — 3074F SYST BP LT 130 MM HG: CPT | Performed by: INTERNAL MEDICINE

## 2024-05-22 PROCEDURE — 1157F ADVNC CARE PLAN IN RCRD: CPT | Performed by: INTERNAL MEDICINE

## 2024-05-22 RX ORDER — FUROSEMIDE 20 MG/1
20 TABLET ORAL DAILY
Qty: 90 TABLET | Refills: 1 | Status: SHIPPED | OUTPATIENT
Start: 2024-05-22

## 2024-05-22 RX ORDER — METOPROLOL SUCCINATE 50 MG/1
TABLET, EXTENDED RELEASE ORAL
COMMUNITY
Start: 2024-04-11

## 2024-05-22 RX ORDER — LISINOPRIL 20 MG/1
20 TABLET ORAL DAILY
Qty: 90 TABLET | Refills: 1 | Status: SHIPPED | OUTPATIENT
Start: 2024-05-22

## 2024-05-22 RX ORDER — AMLODIPINE BESYLATE 5 MG/1
5 TABLET ORAL DAILY
Qty: 90 TABLET | Refills: 1 | Status: SHIPPED | OUTPATIENT
Start: 2024-05-22

## 2024-05-22 RX ORDER — LOVASTATIN 20 MG/1
20 TABLET ORAL DAILY
Qty: 90 TABLET | Refills: 1 | Status: SHIPPED | OUTPATIENT
Start: 2024-05-22

## 2024-05-22 ASSESSMENT — ENCOUNTER SYMPTOMS
DIZZINESS: 0
UNEXPECTED WEIGHT CHANGE: 0
HEADACHES: 0
DIARRHEA: 0
DIFFICULTY URINATING: 0
BLOOD IN STOOL: 0
SINUS PAIN: 0
FATIGUE: 0
HYPERTENSION: 1
ARTHRALGIAS: 0
ABDOMINAL PAIN: 0
SORE THROAT: 0
COUGH: 0
PALPITATIONS: 0
WHEEZING: 0
FEVER: 0
BRUISES/BLEEDS EASILY: 0

## 2024-05-22 NOTE — PROGRESS NOTES
Subjective   Patient ID: Sofie Ulloa is a 83 y.o. female who presents for Diabetes, Hypertension, Hyperlipidemia, GERD, Results, and needs diagnostic mammogram order (Abnormal mammogram 6 months ago).    - Recent blood work reviewed  - Chronic kidney disease stage III A stable maximize medical management low-salt diet avoid any NSAIDs  -Blood pressure record from home reviewed with patient range 120/70  - Diabetes doing much better, hemoglobin A1c 7.5 continue with current medication  - IBS continue monitoring closely symptoms are controlled now  - CHF compensated low-salt diet  - Chronic hyponatremia under control follow-up BMP  -History of gastric ulcer in remission  -Chronic kidney disease stable avoid any salt maximize medical management increase fluid intake  -Hypertension controlled  - Hypercholesterolemia controlled continue with current medication  -Abnormal mammogram request left diagnostic mammogram 6 months order placed today  --Follow-up 3 months          Diabetes  Pertinent negatives for hypoglycemia include no dizziness or headaches. Pertinent negatives for diabetes include no chest pain and no fatigue.   Hypertension  Pertinent negatives include no chest pain, headaches or palpitations.   Hyperlipidemia  Pertinent negatives include no chest pain.   GERD  She reports no abdominal pain, no chest pain, no coughing, no sore throat or no wheezing. Pertinent negatives include no fatigue.          Review of Systems   Constitutional:  Negative for fatigue, fever and unexpected weight change.   HENT:  Negative for congestion, ear discharge, ear pain, mouth sores, sinus pain and sore throat.    Eyes:  Negative for visual disturbance.   Respiratory:  Negative for cough and wheezing.    Cardiovascular:  Negative for chest pain, palpitations and leg swelling.   Gastrointestinal:  Negative for abdominal pain, blood in stool and diarrhea.   Genitourinary:  Negative for difficulty urinating.   Musculoskeletal:   Negative for arthralgias.   Skin:  Negative for rash.   Neurological:  Negative for dizziness and headaches.   Hematological:  Does not bruise/bleed easily.   Psychiatric/Behavioral:  Negative for behavioral problems.    All other systems reviewed and are negative.      Objective   Lab Results   Component Value Date    HGBA1C 7.5 (A) 05/22/2024      /70   Pulse 76   Temp 35.8 °C (96.4 °F)   Wt 50.1 kg (110 lb 6.4 oz)   SpO2 99%   BMI 20.19 kg/m²   Lab Results   Component Value Date    WBC 8.5 05/09/2024    HGB 13.1 05/09/2024    HCT 39.7 05/09/2024     05/09/2024    CHOL 108 05/09/2024    TRIG 113 05/09/2024    HDL 32.8 05/09/2024    ALT 11 05/09/2024    AST 17 05/09/2024     (L) 05/09/2024    K 4.4 05/09/2024    CL 92 (L) 05/09/2024    CREATININE 1.08 (H) 05/09/2024    BUN 15 05/09/2024    CO2 28 05/09/2024    TSH 1.68 05/09/2024    INR 1.5 (H) 12/09/2019    HGBA1C 7.5 (A) 05/22/2024     par   Physical Exam  Vitals and nursing note reviewed.   Constitutional:       Appearance: Normal appearance.   HENT:      Head: Normocephalic.      Nose: Nose normal.   Eyes:      Conjunctiva/sclera: Conjunctivae normal.      Pupils: Pupils are equal, round, and reactive to light.   Cardiovascular:      Rate and Rhythm: Regular rhythm.   Pulmonary:      Effort: Pulmonary effort is normal.      Breath sounds: Normal breath sounds.   Abdominal:      General: Abdomen is flat.      Palpations: Abdomen is soft.   Musculoskeletal:      Cervical back: Neck supple.   Skin:     General: Skin is warm.   Neurological:      General: No focal deficit present.      Mental Status: She is oriented to person, place, and time.   Psychiatric:         Mood and Affect: Mood normal.         Assessment/Plan   Sofie was seen today for diabetes, hypertension, hyperlipidemia, gerd, results and needs diagnostic mammogram order.  Diagnoses and all orders for this visit:  Diabetes mellitus type 2, noninsulin dependent (Multi)  (Primary)  -     POCT fingerstick glucose manually resulted  -     POCT glycosylated hemoglobin (Hb A1C) manually resulted  Abnormal mammogram  -     BI mammo left diagnostic; Future  Stage 3a chronic kidney disease (Multi)  Essential (primary) hypertension  -     amLODIPine (Norvasc) 5 mg tablet; Take 1 tablet (5 mg) by mouth once daily.  Bilateral edema of lower extremity  -     furosemide (Lasix) 20 mg tablet; Take 1 tablet (20 mg) by mouth once daily.  Hypertension, uncontrolled  -     lisinopril 20 mg tablet; Take 1 tablet (20 mg) by mouth once daily.  Hypercholesterolemia  -     lovastatin (Mevacor) 20 mg tablet; Take 1 tablet (20 mg) by mouth once daily.  Other orders  -     Follow Up In Primary Care - Established  -     Follow Up In Primary Care - Established; Future   - Recent blood work reviewed  - Chronic kidney disease stage III A stable maximize medical management low-salt diet avoid any NSAIDs  -Blood pressure record from home reviewed with patient range 120/70  - Diabetes doing much better, hemoglobin A1c 7.5 continue with current medication  - IBS continue monitoring closely symptoms are controlled now  - CHF compensated low-salt diet  - Chronic hyponatremia under control follow-up BMP  -History of gastric ulcer in remission  -Chronic kidney disease stable avoid any salt maximize medical management increase fluid intake  -Hypertension controlled  - Hypercholesterolemia controlled continue with current medication  -Abnormal mammogram request left diagnostic mammogram 6 months order placed today  --Follow-up 3 months

## 2024-05-31 ENCOUNTER — HOSPITAL ENCOUNTER (OUTPATIENT)
Dept: RADIOLOGY | Facility: HOSPITAL | Age: 84
Discharge: HOME | End: 2024-05-31
Payer: MEDICARE

## 2024-05-31 VITALS — HEIGHT: 62 IN | BODY MASS INDEX: 20.24 KG/M2 | WEIGHT: 110 LBS

## 2024-05-31 DIAGNOSIS — R92.8 ABNORMAL MAMMOGRAM: ICD-10-CM

## 2024-05-31 PROCEDURE — 77065 DX MAMMO INCL CAD UNI: CPT | Mod: LT

## 2024-05-31 PROCEDURE — 77065 DX MAMMO INCL CAD UNI: CPT | Mod: LEFT SIDE | Performed by: RADIOLOGY

## 2024-06-15 DIAGNOSIS — E11.9 TYPE 2 DIABETES MELLITUS WITHOUT COMPLICATIONS (MULTI): ICD-10-CM

## 2024-06-17 RX ORDER — BLOOD SUGAR DIAGNOSTIC
STRIP MISCELLANEOUS
Qty: 200 STRIP | Refills: 1 | Status: SHIPPED | OUTPATIENT
Start: 2024-06-17

## 2024-08-08 ENCOUNTER — LAB (OUTPATIENT)
Dept: LAB | Facility: LAB | Age: 84
End: 2024-08-08
Payer: MEDICARE

## 2024-08-08 ENCOUNTER — OFFICE VISIT (OUTPATIENT)
Dept: PRIMARY CARE | Facility: CLINIC | Age: 84
End: 2024-08-08
Payer: MEDICARE

## 2024-08-08 VITALS
TEMPERATURE: 97.9 F | OXYGEN SATURATION: 100 % | WEIGHT: 109.8 LBS | HEART RATE: 68 BPM | DIASTOLIC BLOOD PRESSURE: 84 MMHG | BODY MASS INDEX: 20.08 KG/M2 | SYSTOLIC BLOOD PRESSURE: 154 MMHG

## 2024-08-08 DIAGNOSIS — N18.31 STAGE 3A CHRONIC KIDNEY DISEASE (MULTI): ICD-10-CM

## 2024-08-08 DIAGNOSIS — R39.9 URINARY SYMPTOM OR SIGN: ICD-10-CM

## 2024-08-08 DIAGNOSIS — E11.65 TYPE 2 DIABETES MELLITUS WITH HYPERGLYCEMIA, WITHOUT LONG-TERM CURRENT USE OF INSULIN (MULTI): ICD-10-CM

## 2024-08-08 DIAGNOSIS — N30.00 ACUTE CYSTITIS WITHOUT HEMATURIA: Primary | ICD-10-CM

## 2024-08-08 DIAGNOSIS — I10 HYPERTENSION, UNCONTROLLED: ICD-10-CM

## 2024-08-08 LAB
APPEARANCE UR: CLEAR
BILIRUB UR STRIP.AUTO-MCNC: NEGATIVE MG/DL
COLOR UR: YELLOW
GLUCOSE UR STRIP.AUTO-MCNC: NORMAL MG/DL
KETONES UR STRIP.AUTO-MCNC: NEGATIVE MG/DL
LEUKOCYTE ESTERASE UR QL STRIP.AUTO: NEGATIVE
NITRITE UR QL STRIP.AUTO: NEGATIVE
PH UR STRIP.AUTO: 6.5 [PH]
PROT UR STRIP.AUTO-MCNC: ABNORMAL MG/DL
RBC # UR STRIP.AUTO: NEGATIVE /UL
RBC #/AREA URNS AUTO: NORMAL /HPF
SP GR UR STRIP.AUTO: 1
UROBILINOGEN UR STRIP.AUTO-MCNC: NORMAL MG/DL
WBC #/AREA URNS AUTO: NORMAL /HPF

## 2024-08-08 PROCEDURE — 1036F TOBACCO NON-USER: CPT | Performed by: INTERNAL MEDICINE

## 2024-08-08 PROCEDURE — 99214 OFFICE O/P EST MOD 30 MIN: CPT | Performed by: INTERNAL MEDICINE

## 2024-08-08 PROCEDURE — 1160F RVW MEDS BY RX/DR IN RCRD: CPT | Performed by: INTERNAL MEDICINE

## 2024-08-08 PROCEDURE — 81001 URINALYSIS AUTO W/SCOPE: CPT

## 2024-08-08 PROCEDURE — 1159F MED LIST DOCD IN RCRD: CPT | Performed by: INTERNAL MEDICINE

## 2024-08-08 PROCEDURE — 1157F ADVNC CARE PLAN IN RCRD: CPT | Performed by: INTERNAL MEDICINE

## 2024-08-08 PROCEDURE — 3079F DIAST BP 80-89 MM HG: CPT | Performed by: INTERNAL MEDICINE

## 2024-08-08 PROCEDURE — 3077F SYST BP >= 140 MM HG: CPT | Performed by: INTERNAL MEDICINE

## 2024-08-08 RX ORDER — NITROFURANTOIN 25; 75 MG/1; MG/1
100 CAPSULE ORAL 2 TIMES DAILY
Qty: 14 CAPSULE | Refills: 0 | Status: SHIPPED | OUTPATIENT
Start: 2024-08-08 | End: 2024-08-15

## 2024-08-08 ASSESSMENT — ENCOUNTER SYMPTOMS
DYSURIA: 1
PALPITATIONS: 0
SINUS PAIN: 0
FATIGUE: 0
DIFFICULTY URINATING: 0
BRUISES/BLEEDS EASILY: 0
UNEXPECTED WEIGHT CHANGE: 0
ABDOMINAL PAIN: 0
SORE THROAT: 0
COUGH: 0
FEVER: 0
WHEEZING: 0
DIZZINESS: 0
DIARRHEA: 0
HEADACHES: 0
ARTHRALGIAS: 0
BLOOD IN STOOL: 0

## 2024-08-08 NOTE — PROGRESS NOTES
Subjective   Patient ID: Sofie Ulloa is a 84 y.o. female who presents for UTI (Frequency, pressure x3 days).    - Patient comes to the office today urine frequency pressure for the last 3 days  Urine test negative may have underlying cystitis counseled about fluid intake continue with Azo over-the-counter  Add nitrofurantoin twice a day follow-up if no improvement  -Chronic kidney disease stage III A stable maximize medical management low-salt diet avoid any NSAIDs.  -Blood pressure record from home reviewed with patient range 120/70.  - Diabetes doing much better, hemoglobin A1c 7.5 continue with current medication.  - IBS continue monitoring closely symptoms are controlled now.,  Continues hide fiber diet  - CHF compensated low-salt diet no leg swelling no shortness of breath  - Chronic hyponatremia under control follow-up BMP as scheduled  -History of gastric ulcer in remission no abdominal pain no hematemesis or melena continue with current medication  Remove that-Hypertension controlled  - Hypercholesterolemia controlled continue with current medication  -Abnormal mammogram follow-up diagnostic mammogram November 2024  --Follow-up 3 months                   Review of Systems   Constitutional:  Negative for fatigue, fever and unexpected weight change.   HENT:  Negative for congestion, ear discharge, ear pain, mouth sores, sinus pain and sore throat.    Eyes:  Negative for visual disturbance.   Respiratory:  Negative for cough and wheezing.    Cardiovascular:  Negative for chest pain, palpitations and leg swelling.   Gastrointestinal:  Negative for abdominal pain, blood in stool and diarrhea.   Genitourinary:  Positive for dysuria. Negative for difficulty urinating.   Musculoskeletal:  Negative for arthralgias.   Skin:  Negative for rash.   Neurological:  Negative for dizziness and headaches.   Hematological:  Does not bruise/bleed easily.   Psychiatric/Behavioral:  Negative for behavioral problems.    All other  systems reviewed and are negative.      Objective   Lab Results   Component Value Date    HGBA1C 7.5 (A) 05/22/2024      /84   Pulse 68   Temp 36.6 °C (97.9 °F)   Wt 49.8 kg (109 lb 12.8 oz)   SpO2 100%   BMI 20.08 kg/m²     Physical Exam  Vitals and nursing note reviewed.   Constitutional:       Appearance: Normal appearance.   HENT:      Head: Normocephalic.      Nose: Nose normal.   Eyes:      Conjunctiva/sclera: Conjunctivae normal.      Pupils: Pupils are equal, round, and reactive to light.   Cardiovascular:      Rate and Rhythm: Regular rhythm.   Pulmonary:      Effort: Pulmonary effort is normal.      Breath sounds: Normal breath sounds.   Abdominal:      General: Abdomen is flat.      Palpations: Abdomen is soft.   Musculoskeletal:      Cervical back: Neck supple.   Skin:     General: Skin is warm.   Neurological:      General: No focal deficit present.      Mental Status: She is oriented to person, place, and time.   Psychiatric:         Mood and Affect: Mood normal.         Assessment/Plan   Sofie was seen today for uti.  Diagnoses and all orders for this visit:  Acute cystitis without hematuria (Primary)  -     nitrofurantoin, macrocrystal-monohydrate, (Macrobid) 100 mg capsule; Take 1 capsule (100 mg) by mouth 2 times a day for 7 days.  Urinary symptom or sign  -     Urinalysis with Reflex Culture and Microscopic; Future  Stage 3a chronic kidney disease (Multi)  Hypertension, uncontrolled  Type 2 diabetes mellitus with hyperglycemia, without long-term current use of insulin (Multi)   - Patient comes to the office today urine frequency pressure for the last 3 days  Urine test negative may have underlying cystitis counseled about fluid intake continue with Azo over-the-counter  Add nitrofurantoin twice a day follow-up if no improvement  -Chronic kidney disease stage III A stable maximize medical management low-salt diet avoid any NSAIDs.  -Blood pressure record from home reviewed with patient  range 120/70.  - Diabetes doing much better, hemoglobin A1c 7.5 continue with current medication.  - IBS continue monitoring closely symptoms are controlled now.,  Continues hide fiber diet  - CHF compensated low-salt diet no leg swelling no shortness of breath  - Chronic hyponatremia under control follow-up BMP as scheduled  -History of gastric ulcer in remission no abdominal pain no hematemesis or melena continue with current medication  Remove that-Hypertension controlled  - Hypercholesterolemia controlled continue with current medication  -Abnormal mammogram follow-up diagnostic mammogram November 2024  --Follow-up 3 months

## 2024-08-09 LAB — HOLD SPECIMEN: NORMAL

## 2024-08-22 ENCOUNTER — APPOINTMENT (OUTPATIENT)
Dept: PRIMARY CARE | Facility: CLINIC | Age: 84
End: 2024-08-22
Payer: MEDICARE

## 2024-08-22 VITALS
TEMPERATURE: 97.2 F | OXYGEN SATURATION: 97 % | SYSTOLIC BLOOD PRESSURE: 120 MMHG | DIASTOLIC BLOOD PRESSURE: 70 MMHG | WEIGHT: 110.6 LBS | HEART RATE: 56 BPM | BODY MASS INDEX: 20.23 KG/M2

## 2024-08-22 DIAGNOSIS — I10 HYPERTENSION, UNCONTROLLED: ICD-10-CM

## 2024-08-22 DIAGNOSIS — Z12.31 ENCOUNTER FOR SCREENING MAMMOGRAM FOR MALIGNANT NEOPLASM OF BREAST: Primary | ICD-10-CM

## 2024-08-22 PROCEDURE — 1159F MED LIST DOCD IN RCRD: CPT | Performed by: INTERNAL MEDICINE

## 2024-08-22 PROCEDURE — 3074F SYST BP LT 130 MM HG: CPT | Performed by: INTERNAL MEDICINE

## 2024-08-22 PROCEDURE — 1036F TOBACCO NON-USER: CPT | Performed by: INTERNAL MEDICINE

## 2024-08-22 PROCEDURE — 1160F RVW MEDS BY RX/DR IN RCRD: CPT | Performed by: INTERNAL MEDICINE

## 2024-08-22 PROCEDURE — 3078F DIAST BP <80 MM HG: CPT | Performed by: INTERNAL MEDICINE

## 2024-08-22 PROCEDURE — 99214 OFFICE O/P EST MOD 30 MIN: CPT | Performed by: INTERNAL MEDICINE

## 2024-08-22 PROCEDURE — 1157F ADVNC CARE PLAN IN RCRD: CPT | Performed by: INTERNAL MEDICINE

## 2024-08-22 RX ORDER — LISINOPRIL 20 MG/1
20 TABLET ORAL DAILY
Qty: 90 TABLET | Refills: 1 | Status: SHIPPED | OUTPATIENT
Start: 2024-08-22

## 2024-08-22 ASSESSMENT — ENCOUNTER SYMPTOMS
DIZZINESS: 0
COUGH: 0
UNEXPECTED WEIGHT CHANGE: 0
DIFFICULTY URINATING: 0
BLOOD IN STOOL: 0
ABDOMINAL PAIN: 0
HYPERTENSION: 1
WHEEZING: 0
SINUS PAIN: 0
FEVER: 0
ARTHRALGIAS: 0
PALPITATIONS: 0
DIARRHEA: 0
SORE THROAT: 0
FATIGUE: 0
BRUISES/BLEEDS EASILY: 0
HEADACHES: 0

## 2024-08-22 NOTE — PROGRESS NOTES
Subjective   Patient ID: Sofie Ulloa is a 84 y.o. female who presents for Diabetes and Hypertension.    --Recent diagnostic mammogram no significant findings recommendation to follow-up with 6 months annual exam again new order placed today follow-up results closely  -Cystitis, resulted increase fluid intake counseled about prevention  -Chronic kidney disease stage III A stable maximize medical management low-salt diet avoid any NSAIDs.  Follow-up renal function  -Blood pressure record from home reviewed with patient range 120/70.  - Diabetes doing much better, hemoglobin A1c 7.5 continue with current medication.  - IBS continue monitoring closely symptoms are controlled now.,  Continues hide fiber diet compensated  - CHF compensated low-salt diet no leg swelling no shortness of breath continue low-salt diet  - Chronic hyponatremia under control follow-up BMP as scheduled.  -History of gastric ulcer in remission no abdominal pain no hematemesis or melena continue with current medication.  Remove that  -Hypertension controlled continue with current medication continue monitoring at home as recommended  - Hypercholesterolemia controlled continue with current medication  -Follow-up 3 months    Diabetes  Pertinent negatives for hypoglycemia include no dizziness or headaches. Pertinent negatives for diabetes include no chest pain and no fatigue.   Hypertension  Pertinent negatives include no chest pain, headaches or palpitations.          Review of Systems   Constitutional:  Negative for fatigue, fever and unexpected weight change.   HENT:  Negative for congestion, ear discharge, ear pain, mouth sores, sinus pain and sore throat.    Eyes:  Negative for visual disturbance.   Respiratory:  Negative for cough and wheezing.    Cardiovascular:  Negative for chest pain, palpitations and leg swelling.   Gastrointestinal:  Negative for abdominal pain, blood in stool and diarrhea.   Genitourinary:  Negative for difficulty  urinating.   Musculoskeletal:  Negative for arthralgias.   Skin:  Negative for rash.   Neurological:  Negative for dizziness and headaches.   Hematological:  Does not bruise/bleed easily.   Psychiatric/Behavioral:  Negative for behavioral problems.    All other systems reviewed and are negative.      Objective   Lab Results   Component Value Date    HGBA1C 7.5 (A) 05/22/2024      /70   Pulse 56   Temp 36.2 °C (97.2 °F)   Wt 50.2 kg (110 lb 9.6 oz)   SpO2 97%   BMI 20.23 kg/m²     Physical Exam  Vitals and nursing note reviewed.   Constitutional:       Appearance: Normal appearance.   HENT:      Head: Normocephalic.      Nose: Nose normal.   Eyes:      Conjunctiva/sclera: Conjunctivae normal.      Pupils: Pupils are equal, round, and reactive to light.   Cardiovascular:      Rate and Rhythm: Regular rhythm.   Pulmonary:      Effort: Pulmonary effort is normal.      Breath sounds: Normal breath sounds.   Abdominal:      General: Abdomen is flat.      Palpations: Abdomen is soft.   Musculoskeletal:      Cervical back: Neck supple.   Skin:     General: Skin is warm.   Neurological:      General: No focal deficit present.      Mental Status: She is oriented to person, place, and time.   Psychiatric:         Mood and Affect: Mood normal.         Assessment/Plan   Sofie was seen today for diabetes and hypertension.  Diagnoses and all orders for this visit:  Encounter for screening mammogram for malignant neoplasm of breast (Primary)  -     BI mammo bilateral screening tomosynthesis; Future  Hypertension, uncontrolled  -     lisinopril 20 mg tablet; Take 1 tablet (20 mg) by mouth once daily.  Other orders  -     Follow Up In Primary Care - Established   --Recent diagnostic mammogram no significant findings recommendation to follow-up with 6 months annual exam again new order placed today follow-up results closely  -Cystitis, resulted increase fluid intake counseled about prevention  -Chronic kidney disease stage  III A stable maximize medical management low-salt diet avoid any NSAIDs.  Follow-up renal function  -Blood pressure record from home reviewed with patient range 120/70.  - Diabetes doing much better, hemoglobin A1c 7.5 continue with current medication.  - IBS continue monitoring closely symptoms are controlled now.,  Continues hide fiber diet compensated  - CHF compensated low-salt diet no leg swelling no shortness of breath continue low-salt diet  - Chronic hyponatremia under control follow-up BMP as scheduled.  -History of gastric ulcer in remission no abdominal pain no hematemesis or melena continue with current medication.  Remove that  -Hypertension controlled continue with current medication continue monitoring at home as recommended  - Hypercholesterolemia controlled continue with current medication  -Follow-up 3 months

## 2024-09-03 DIAGNOSIS — R60.0 BILATERAL EDEMA OF LOWER EXTREMITY: ICD-10-CM

## 2024-09-03 DIAGNOSIS — E78.00 HYPERCHOLESTEROLEMIA: ICD-10-CM

## 2024-09-03 RX ORDER — FUROSEMIDE 20 MG/1
20 TABLET ORAL DAILY
Qty: 90 TABLET | Refills: 1 | Status: SHIPPED | OUTPATIENT
Start: 2024-09-03

## 2024-09-03 RX ORDER — LOVASTATIN 20 MG/1
20 TABLET ORAL DAILY
Qty: 90 TABLET | Refills: 1 | Status: SHIPPED | OUTPATIENT
Start: 2024-09-03

## 2024-09-19 DIAGNOSIS — E11.9 DIABETES MELLITUS TYPE 2, NONINSULIN DEPENDENT (MULTI): ICD-10-CM

## 2024-09-20 RX ORDER — LINAGLIPTIN 5 MG/1
5 TABLET, FILM COATED ORAL DAILY
Qty: 90 TABLET | Refills: 1 | Status: SHIPPED | OUTPATIENT
Start: 2024-09-20

## 2024-10-14 ENCOUNTER — OFFICE VISIT (OUTPATIENT)
Dept: PRIMARY CARE | Facility: CLINIC | Age: 84
End: 2024-10-14
Payer: MEDICARE

## 2024-10-14 ENCOUNTER — TELEPHONE (OUTPATIENT)
Dept: PRIMARY CARE | Facility: CLINIC | Age: 84
End: 2024-10-14
Payer: MEDICARE

## 2024-10-14 VITALS
TEMPERATURE: 96.7 F | WEIGHT: 111 LBS | HEART RATE: 58 BPM | SYSTOLIC BLOOD PRESSURE: 120 MMHG | DIASTOLIC BLOOD PRESSURE: 60 MMHG | OXYGEN SATURATION: 98 % | BODY MASS INDEX: 20.3 KG/M2

## 2024-10-14 DIAGNOSIS — I48.20 CHRONIC ATRIAL FIBRILLATION, UNSPECIFIED (MULTI): ICD-10-CM

## 2024-10-14 DIAGNOSIS — E78.00 HYPERCHOLESTEROLEMIA: ICD-10-CM

## 2024-10-14 DIAGNOSIS — I10 HYPERTENSION, UNCONTROLLED: ICD-10-CM

## 2024-10-14 DIAGNOSIS — N18.31 STAGE 3A CHRONIC KIDNEY DISEASE (MULTI): ICD-10-CM

## 2024-10-14 DIAGNOSIS — E11.65 TYPE 2 DIABETES MELLITUS WITH HYPERGLYCEMIA, WITHOUT LONG-TERM CURRENT USE OF INSULIN: ICD-10-CM

## 2024-10-14 DIAGNOSIS — K13.79: Primary | ICD-10-CM

## 2024-10-14 PROCEDURE — 1159F MED LIST DOCD IN RCRD: CPT | Performed by: INTERNAL MEDICINE

## 2024-10-14 PROCEDURE — 1157F ADVNC CARE PLAN IN RCRD: CPT | Performed by: INTERNAL MEDICINE

## 2024-10-14 PROCEDURE — 3074F SYST BP LT 130 MM HG: CPT | Performed by: INTERNAL MEDICINE

## 2024-10-14 PROCEDURE — 3078F DIAST BP <80 MM HG: CPT | Performed by: INTERNAL MEDICINE

## 2024-10-14 PROCEDURE — 99213 OFFICE O/P EST LOW 20 MIN: CPT | Performed by: INTERNAL MEDICINE

## 2024-10-14 PROCEDURE — 1036F TOBACCO NON-USER: CPT | Performed by: INTERNAL MEDICINE

## 2024-10-14 PROCEDURE — 1160F RVW MEDS BY RX/DR IN RCRD: CPT | Performed by: INTERNAL MEDICINE

## 2024-10-14 ASSESSMENT — ENCOUNTER SYMPTOMS
DIFFICULTY URINATING: 0
PALPITATIONS: 0
HEADACHES: 0
FATIGUE: 0
FEVER: 0
DIZZINESS: 0
BRUISES/BLEEDS EASILY: 0
WHEEZING: 0
DIARRHEA: 0
SINUS PAIN: 0
UNEXPECTED WEIGHT CHANGE: 0
BLOOD IN STOOL: 0
COUGH: 0
ABDOMINAL PAIN: 0
SORE THROAT: 0
ARTHRALGIAS: 0

## 2024-10-14 NOTE — PROGRESS NOTES
Subjective   Patient ID: oSfie Ulloa is a 84 y.o. female who presents for Coughing Up Blood (Spitting up blood since eating a taco chip at lunch).    --Patient examination mild scratch with bleeding, counseled  , about ice packing pressure mouthwash.  The water if no improvement patient need to contact her dentist  Patient need to continue on current medication continue aspirin Plavix  - Patient avoid any warm fluids need to sleep on her side until losing blood from the hardware.  Follow-up closely if no improvement  -Recent diagnostic mammogram no significant findings recommendation to follow-up with 6 months annual exam again new order placed today follow-up results closely  -CAD continue aspirin Plavix  -Chronic kidney disease stage III A stable maximize medical management low-salt diet avoid any NSAIDs.  Follow-up renal function  -Blood pressure record from home reviewed with patient range 120/70.  - Diabetes doing much better, hemoglobin A1c 7.5 continue with current medication.  - IBS continue monitoring closely symptoms are controlled now.,  Continues hide fiber diet compensated  - CHF compensated low-salt diet no leg swelling no shortness of breath continue low-salt diet  - Chronic hyponatremia under control follow-up BMP as scheduled.  -History of gastric ulcer in remission no abdominal pain no hematemesis or melena continue with current medication.  Remove that  -Hypertension controlled continue with current medication continue monitoring at home as recommended  - Hypercholesterolemia controlled continue with current medication follow-up as scheduled             Review of Systems   Constitutional:  Negative for fatigue, fever and unexpected weight change.   HENT:  Negative for congestion, ear discharge, ear pain, mouth sores, sinus pain and sore throat.         Mild bleeding   Eyes:  Negative for visual disturbance.   Respiratory:  Negative for cough and wheezing.    Cardiovascular:  Negative for chest  pain, palpitations and leg swelling.   Gastrointestinal:  Negative for abdominal pain, blood in stool and diarrhea.   Genitourinary:  Negative for difficulty urinating.   Musculoskeletal:  Negative for arthralgias.   Skin:  Negative for rash.   Neurological:  Negative for dizziness and headaches.   Hematological:  Does not bruise/bleed easily.   Psychiatric/Behavioral:  Negative for behavioral problems.    All other systems reviewed and are negative.      Objective   Lab Results   Component Value Date    HGBA1C 7.5 (A) 05/22/2024      /60   Pulse 58   Temp 35.9 °C (96.7 °F)   Wt 50.3 kg (111 lb)   SpO2 98%   BMI 20.30 kg/m²     Physical Exam  Vitals and nursing note reviewed.   Constitutional:       Appearance: Normal appearance.   HENT:      Head: Normocephalic.      Comments: Bleeding from the roof of the mouth from scratching at hard palate     Nose: Nose normal.   Eyes:      Conjunctiva/sclera: Conjunctivae normal.      Pupils: Pupils are equal, round, and reactive to light.   Cardiovascular:      Rate and Rhythm: Regular rhythm.   Pulmonary:      Effort: Pulmonary effort is normal.      Breath sounds: Normal breath sounds.   Abdominal:      General: Abdomen is flat.      Palpations: Abdomen is soft.   Musculoskeletal:      Cervical back: Neck supple.   Skin:     General: Skin is warm.   Neurological:      General: No focal deficit present.      Mental Status: She is oriented to person, place, and time.   Psychiatric:         Mood and Affect: Mood normal.         Assessment/Plan   Sofie was seen today for coughing up blood.  Diagnoses and all orders for this visit:  Bleeding of mouth (Primary)  Stage 3a chronic kidney disease (Multi)  Type 2 diabetes mellitus with hyperglycemia, without long-term current use of insulin  Hypercholesterolemia  Hypertension, uncontrolled  Chronic atrial fibrillation, unspecified (Multi)   -Patient examination mild scratch with bleeding, counseled  , about ice packing  pressure mouthwash.  The water if no improvement patient need to contact her dentist  Patient need to continue on current medication continue aspirin Plavix  - Patient avoid any warm fluids need to sleep on her side until losing blood from the hardware.  Follow-up closely if no improvement  -Recent diagnostic mammogram no significant findings recommendation to follow-up with 6 months annual exam again new order placed today follow-up results closely  -CAD continue aspirin Plavix  -Chronic kidney disease stage III A stable maximize medical management low-salt diet avoid any NSAIDs.  Follow-up renal function  -Blood pressure record from home reviewed with patient range 120/70.  - Diabetes doing much better, hemoglobin A1c 7.5 continue with current medication.  - IBS continue monitoring closely symptoms are controlled now.,  Continues hide fiber diet compensated  - CHF compensated low-salt diet no leg swelling no shortness of breath continue low-salt diet  - Chronic hyponatremia under control follow-up BMP as scheduled.  -History of gastric ulcer in remission no abdominal pain no hematemesis or melena continue with current medication.  Remove that  -Hypertension controlled continue with current medication continue monitoring at home as recommended  - Hypercholesterolemia controlled continue with current medication follow-up as scheduled

## 2024-10-14 NOTE — TELEPHONE ENCOUNTER
Patient asking for appt, couging up blood several times since noon today. Concerned because she is on a blood thinner.

## 2024-10-28 ENCOUNTER — TELEPHONE (OUTPATIENT)
Dept: PRIMARY CARE | Facility: CLINIC | Age: 84
End: 2024-10-28
Payer: MEDICARE

## 2024-10-29 DIAGNOSIS — R92.8 ABNORMAL MAMMOGRAM: ICD-10-CM

## 2024-11-19 ENCOUNTER — HOSPITAL ENCOUNTER (OUTPATIENT)
Dept: RADIOLOGY | Facility: HOSPITAL | Age: 84
Discharge: HOME | End: 2024-11-19
Payer: MEDICARE

## 2024-11-19 DIAGNOSIS — R92.8 ABNORMAL MAMMOGRAM: ICD-10-CM

## 2024-11-19 PROCEDURE — 77066 DX MAMMO INCL CAD BI: CPT | Performed by: RADIOLOGY

## 2024-11-19 PROCEDURE — 77062 BREAST TOMOSYNTHESIS BI: CPT

## 2024-11-19 PROCEDURE — G0279 TOMOSYNTHESIS, MAMMO: HCPCS | Performed by: RADIOLOGY

## 2024-11-26 ENCOUNTER — APPOINTMENT (OUTPATIENT)
Dept: PRIMARY CARE | Facility: CLINIC | Age: 84
End: 2024-11-26
Payer: MEDICARE

## 2024-11-26 ENCOUNTER — OFFICE VISIT (OUTPATIENT)
Dept: OTOLARYNGOLOGY | Facility: CLINIC | Age: 84
End: 2024-11-26
Payer: MEDICARE

## 2024-11-26 VITALS — WEIGHT: 116 LBS | HEIGHT: 62 IN | TEMPERATURE: 96.8 F | BODY MASS INDEX: 21.35 KG/M2

## 2024-11-26 DIAGNOSIS — J32.9 CHRONIC SINUSITIS, UNSPECIFIED LOCATION: ICD-10-CM

## 2024-11-26 DIAGNOSIS — J31.0 CHRONIC RHINITIS: ICD-10-CM

## 2024-11-26 DIAGNOSIS — B99.9 INFECTION: Primary | ICD-10-CM

## 2024-11-26 PROCEDURE — 99203 OFFICE O/P NEW LOW 30 MIN: CPT | Performed by: OTOLARYNGOLOGY

## 2024-11-26 PROCEDURE — 1159F MED LIST DOCD IN RCRD: CPT | Performed by: OTOLARYNGOLOGY

## 2024-11-26 PROCEDURE — 1157F ADVNC CARE PLAN IN RCRD: CPT | Performed by: OTOLARYNGOLOGY

## 2024-11-26 PROCEDURE — 31231 NASAL ENDOSCOPY DX: CPT | Performed by: OTOLARYNGOLOGY

## 2024-11-26 PROCEDURE — 1036F TOBACCO NON-USER: CPT | Performed by: OTOLARYNGOLOGY

## 2024-11-26 PROCEDURE — 1160F RVW MEDS BY RX/DR IN RCRD: CPT | Performed by: OTOLARYNGOLOGY

## 2024-11-26 RX ORDER — FLUTICASONE PROPIONATE 50 MCG
SPRAY, SUSPENSION (ML) NASAL
Qty: 16 G | Refills: 11 | Status: SHIPPED | OUTPATIENT
Start: 2024-11-26

## 2024-11-26 RX ORDER — MUPIROCIN 20 MG/G
OINTMENT TOPICAL
Qty: 22 G | Refills: 0 | Status: SHIPPED | OUTPATIENT
Start: 2024-11-26

## 2024-11-26 RX ORDER — LATANOPROST 50 UG/ML
1 SOLUTION/ DROPS OPHTHALMIC NIGHTLY
COMMUNITY
Start: 2024-10-12

## 2024-11-26 RX ORDER — DOXYCYCLINE 100 MG/1
100 CAPSULE ORAL 2 TIMES DAILY
Qty: 42 CAPSULE | Refills: 0 | Status: SHIPPED | OUTPATIENT
Start: 2024-11-26 | End: 2024-12-17

## 2024-11-26 RX ORDER — PREDNISONE 10 MG/1
TABLET ORAL
Qty: 39 TABLET | Refills: 0 | Status: SHIPPED | OUTPATIENT
Start: 2024-11-26 | End: 2024-12-08

## 2024-11-26 RX ORDER — POTASSIUM CHLORIDE 750 MG/1
10 CAPSULE, EXTENDED RELEASE ORAL 2 TIMES DAILY
COMMUNITY

## 2024-11-26 NOTE — PROGRESS NOTES
HPI  Sofie Ulloa is a 84 y.o. 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  She had previously had some benefit from mupirocin and rinse and has gotten away from this.  She is here today, however, because of left-sided purulent drainage.  She has been on doxycycline for short course of the time and gets benefit but then symptoms recur      Past Medical History:   Diagnosis Date    Coronary artery disease     Cough, unspecified 03/24/2016    Cough    Diabetes (Multi)     HTN (hypertension)     Hypo-osmolality and hyponatremia 03/01/2016    Hyponatremia    Hypo-osmolality and hyponatremia 08/19/2020    Hyponatremia    Hypomagnesemia 08/23/2022    Hypomagnesemia    Other conditions influencing health status 04/10/2013    Long QT Syndrome    Other nonspecific abnormal finding of lung field 05/05/2015    Abnormal finding on lung imaging    Pain in unspecified wrist 07/22/2015    Pain in wrist joint    Personal history of other diseases of the musculoskeletal system and connective tissue 11/30/2015    History of arthritis    Personal history of other diseases of the respiratory system 12/05/2014    History of acute bronchitis    Personal history of other diseases of the respiratory system 10/08/2014    History of sinusitis    Personal history of urinary (tract) infections 12/05/2014    History of urinary tract infection    Unspecified fracture of upper end of unspecified radius, initial encounter for closed fracture 01/14/2015    Closed comminuted fracture of proximal radius            Medications:     Current Outpatient Medications:     Accu-Chek Yaquelin Plus test strp strip, TEST BLOOD SUGAR 2 TIMES DAILY, Disp: 200 strip, Rfl: 1    Accu-Chek Softclix Lancets misc, Check blood sugar twice a day dx: e11.9, Disp: 200 each, Rfl: 1    amLODIPine (Norvasc) 5 mg tablet, Take 1 tablet (5 mg) by mouth once daily., Disp: 90  Patient is 79 year man who presents with evidence sepsis secondary urinary tract infection with hypertension.  Patient started broad-spectrum antibiotics.  Patient volume resuscitated intravenous fluids.  Blood pressure improved.  Patient step-down to the floor.  Wound care service consulted for recommendations regarding his wound.  Patient had recurrent episodes of hypoglycemia.  Patient started intravenous dextrose.  ACTH stimulation test resulted in serum concentration > 20 mcg/dL at 60 minutes speaking against adrenal insufficieny.    tablet, Rfl: 1    apixaban (Eliquis) 2.5 mg tablet, Take 1 tablet (2.5 mg) by mouth 2 times a day., Disp: 180 tablet, Rfl: 3    brimonidine-timoloL (Combigan) 0.2-0.5 % ophthalmic solution, Administer into affected eye(s)., Disp: , Rfl:     cholecalciferol (Vitamin D-3) 25 MCG (1000 UT) tablet, Take by mouth. TAKE AS DIRECTED., Disp: , Rfl:     clopidogrel (Plavix) 75 mg tablet, Take 1 tablet (75 mg) by mouth once daily., Disp: , Rfl:     digoxin (Lanoxin) 125 MCG tablet, Take 1 tablet (125 mcg) by mouth. On Monday Wednesday and Friday, Disp: , Rfl:     ferrous sulfate 325 (65 Fe) MG tablet, Take 1 tablet (325 mg) by mouth once daily., Disp: , Rfl:     furosemide (Lasix) 20 mg tablet, TAKE 1 TABLET BY MOUTH EVERY DAY, Disp: 90 tablet, Rfl: 1    isosorbide mononitrate ER (Imdur) 60 mg 24 hr tablet, Take 1 tablet (60 mg) by mouth once daily., Disp: , Rfl:     latanoprost (Xalatan) 0.005 % ophthalmic solution, Administer 1 drop into both eyes once daily at bedtime., Disp: , Rfl:     lisinopril 20 mg tablet, Take 1 tablet (20 mg) by mouth once daily., Disp: 90 tablet, Rfl: 1    lovastatin (Mevacor) 20 mg tablet, TAKE 1 TABLET BY MOUTH EVERY DAY, Disp: 90 tablet, Rfl: 1    lutein 20 mg capsule, Take 1 capsule by mouth once daily., Disp: , Rfl:     magnesium oxide 400 mg magnesium capsule, Take 1 capsule (400 mg) by mouth once daily., Disp: , Rfl:     metoprolol succinate XL (Toprol-XL) 50 mg 24 hr tablet, TAKE 1 TABLET BY MOUTH DAILY WITH MEAL WITH A MEAL, Disp: , Rfl:     mupirocin (Bactroban) 2 % cream, Apply 1 Application topically see administration instructions. Apply as directed by physician, Disp: 30 g, Rfl: 0    pantoprazole (ProtoNix) 40 mg EC tablet, Take by mouth., Disp: , Rfl:     potassium chloride ER (Micro-K) 10 mEq ER capsule, Take 1 capsule (10 mEq) by mouth 2 times a day. Do not crush or chew., Disp: , Rfl:     Tradjenta 5 mg tablet, TAKE 1 TABLET BY MOUTH EVERY DAY, Disp: 90 tablet, Rfl: 1     "azelastine (Astelin) 137 mcg (0.1 %) nasal spray, Administer 1 spray into each nostril 2 times a day. Use in each nostril as directed (Patient not taking: Reported on 11/26/2024), Disp: 30 mL, Rfl: 12    doxycycline (Monodox) 100 mg capsule, Take 1 capsule (100 mg) by mouth 2 times a day. Take with at least 8 ounces (large glass) of water, do not lie down for 30 minutes after (Patient not taking: Reported on 11/26/2024), Disp: 14 capsule, Rfl: 0    doxycycline (Vibramycin) 100 mg capsule, Take 1 capsule (100 mg) by mouth 2 times a day for 21 days. Take with at least 8 ounces (large glass) of water, do not lie down for 30 minutes after, Disp: 42 capsule, Rfl: 0    fluticasone (Flonase) 50 mcg/actuation nasal spray, Administer 2 pumps to each nostril once daily, Disp: 16 g, Rfl: 11    mupirocin (Bactroban) 2 % ointment, Apply to nose as directed twice daily x 7 days, Disp: 22 g, Rfl: 0    predniSONE (Deltasone) 10 mg tablet, Take 6 tabs PO daily x 3 days, then 4 tabs PO daily x 3 days, then 2 tabs PO daily x 3 days, then 1 tab PO daily x 3 days, Disp: 39 tablet, Rfl: 0     Allergies:  Allergies   Allergen Reactions    Amoxicillin-Pot Clavulanate Nausea Only    Losartan Nausea Only    Sulfa (Sulfonamide Antibiotics) Nausea Only        Physical Exam:  Last Recorded Vitals  Temperature 36 °C (96.8 °F), height 1.575 m (5' 2\"), weight 52.6 kg (116 lb).  General:     General appearance: Well-developed, well-nourished in no acute distress.       Voice:  normal       Head/face: Normal appearance; nontender to palpation     Facial nerve function: Normal and symmetric bilaterally.    Oral/oropharynx:     Oral vestibule: Normal labial and gingival mucosa     Tongue/floor of mouth: Normal without lesion     Oropharynx: Clear.  No lesions present of the hard/soft palate, posterior pharynx    Neck:     Neck: Normal appearance, trachea midline     Salivary glands: Normal to palpation bilaterally     Lymph nodes: No cervical " lymphadenopathy to palpation     Thyroid: No thyromegaly.  No palpable nodules     Range of motion: Normal    Neurological:     Cortical functions: Alert and oriented x3, appropriate affect       Larynx/hypopharynx:     Laryngeal findings: Mirror exam inadequate or limited secondary to enlarged base of tongue and/or excessive gagging    Ear:     Ear canal: Normal bilaterally     Tympanic membrane: Intact and mobile bilaterally     Pinna: Normal bilaterally     Hearing:  Gross hearing assessment normal by voice    Nose:     Visualized using: Flexible nasal endoscopy utilized secondary to inadequate anterior rhinoscopy  Nasopharynx: Inadequate mirror examination as above, endoscopy demonstrates normal nasopharynx without obstruction or mass     Nasal dorsum: Nontraumatic midline appearance     Septum: Midline     Inferior turbinates: Normally sized     Mucosa: Right side with large antrostomy but purulent drainage within it.  Left side with purulence from the middle meatus      ASSESSMENT/PLAN:  Bilateral sinusitis.  Recommend doxycycline for 3 weeks, prednisone (she will watch her blood glucose which she does regularly), mupirocin to resume her rinses and Flonase.  I will see her back in a month and she will call for CT scan prior if symptoms not improved        Lito Marley MD

## 2024-12-02 ENCOUNTER — TELEPHONE (OUTPATIENT)
Dept: OTOLARYNGOLOGY | Facility: CLINIC | Age: 84
End: 2024-12-02
Payer: MEDICARE

## 2024-12-02 NOTE — TELEPHONE ENCOUNTER
LOV 11/26/2024 for b/l sinusitis and was rx'd abx and prednisone, spray and rinses. She called and wanted you to know she is not going to take the prednisone due to the risk of heart failure - she has been previously hospitalized with heart failure. She is completing the remainder of the recommendations.

## 2024-12-10 ENCOUNTER — APPOINTMENT (OUTPATIENT)
Dept: PRIMARY CARE | Facility: CLINIC | Age: 84
End: 2024-12-10
Payer: MEDICARE

## 2024-12-10 VITALS
WEIGHT: 111.8 LBS | DIASTOLIC BLOOD PRESSURE: 60 MMHG | HEART RATE: 51 BPM | OXYGEN SATURATION: 100 % | SYSTOLIC BLOOD PRESSURE: 120 MMHG | BODY MASS INDEX: 20.45 KG/M2 | TEMPERATURE: 97.7 F

## 2024-12-10 DIAGNOSIS — I10 HYPERTENSION, UNCONTROLLED: ICD-10-CM

## 2024-12-10 DIAGNOSIS — E78.00 HYPERCHOLESTEROLEMIA: ICD-10-CM

## 2024-12-10 DIAGNOSIS — I48.20 CHRONIC ATRIAL FIBRILLATION, UNSPECIFIED (MULTI): ICD-10-CM

## 2024-12-10 DIAGNOSIS — R60.0 BILATERAL EDEMA OF LOWER EXTREMITY: ICD-10-CM

## 2024-12-10 DIAGNOSIS — J31.0 CHRONIC RHINITIS: ICD-10-CM

## 2024-12-10 DIAGNOSIS — E11.9 DIABETES MELLITUS TYPE 2, NONINSULIN DEPENDENT (MULTI): Primary | ICD-10-CM

## 2024-12-10 LAB
POC FINGERSTICK BLOOD GLUCOSE: 202 MG/DL (ref 70–100)
POC HEMOGLOBIN A1C: 7.2 % (ref 4.2–6.5)

## 2024-12-10 PROCEDURE — 1157F ADVNC CARE PLAN IN RCRD: CPT | Performed by: INTERNAL MEDICINE

## 2024-12-10 PROCEDURE — 3078F DIAST BP <80 MM HG: CPT | Performed by: INTERNAL MEDICINE

## 2024-12-10 PROCEDURE — 82962 GLUCOSE BLOOD TEST: CPT | Performed by: INTERNAL MEDICINE

## 2024-12-10 PROCEDURE — 83036 HEMOGLOBIN GLYCOSYLATED A1C: CPT | Performed by: INTERNAL MEDICINE

## 2024-12-10 PROCEDURE — 1036F TOBACCO NON-USER: CPT | Performed by: INTERNAL MEDICINE

## 2024-12-10 PROCEDURE — 1160F RVW MEDS BY RX/DR IN RCRD: CPT | Performed by: INTERNAL MEDICINE

## 2024-12-10 PROCEDURE — 3074F SYST BP LT 130 MM HG: CPT | Performed by: INTERNAL MEDICINE

## 2024-12-10 PROCEDURE — 1159F MED LIST DOCD IN RCRD: CPT | Performed by: INTERNAL MEDICINE

## 2024-12-10 PROCEDURE — G2211 COMPLEX E/M VISIT ADD ON: HCPCS | Performed by: INTERNAL MEDICINE

## 2024-12-10 PROCEDURE — 99214 OFFICE O/P EST MOD 30 MIN: CPT | Performed by: INTERNAL MEDICINE

## 2024-12-10 RX ORDER — BRIMONIDINE TARTRATE 2 MG/ML
1 SOLUTION/ DROPS OPHTHALMIC 2 TIMES DAILY
COMMUNITY
Start: 2024-09-04

## 2024-12-10 RX ORDER — LOVASTATIN 20 MG/1
20 TABLET ORAL DAILY
Qty: 90 TABLET | Refills: 1 | Status: SHIPPED | OUTPATIENT
Start: 2024-12-10 | End: 2024-12-10 | Stop reason: SDUPTHER

## 2024-12-10 RX ORDER — FUROSEMIDE 20 MG/1
20 TABLET ORAL DAILY
Qty: 90 TABLET | Refills: 1 | Status: SHIPPED | OUTPATIENT
Start: 2024-12-10 | End: 2024-12-10 | Stop reason: SDUPTHER

## 2024-12-10 RX ORDER — LOVASTATIN 20 MG/1
20 TABLET ORAL DAILY
Qty: 90 TABLET | Refills: 1 | Status: SHIPPED | OUTPATIENT
Start: 2024-12-10

## 2024-12-10 RX ORDER — LISINOPRIL 20 MG/1
20 TABLET ORAL DAILY
Qty: 90 TABLET | Refills: 1 | Status: SHIPPED | OUTPATIENT
Start: 2024-12-10

## 2024-12-10 RX ORDER — FUROSEMIDE 20 MG/1
20 TABLET ORAL DAILY
Qty: 90 TABLET | Refills: 1 | Status: SHIPPED | OUTPATIENT
Start: 2024-12-10

## 2024-12-10 RX ORDER — TIMOLOL MALEATE 5 MG/ML
1 SOLUTION/ DROPS OPHTHALMIC 2 TIMES DAILY
COMMUNITY
Start: 2024-12-02

## 2024-12-10 ASSESSMENT — ENCOUNTER SYMPTOMS
ABDOMINAL PAIN: 0
FEVER: 0
FATIGUE: 0
DIARRHEA: 0
ARTHRALGIAS: 0
PALPITATIONS: 0
HEADACHES: 0
HYPERTENSION: 1
SINUS PAIN: 0
UNEXPECTED WEIGHT CHANGE: 0
SORE THROAT: 0
WHEEZING: 0
BLOOD IN STOOL: 0
BRUISES/BLEEDS EASILY: 0
DIFFICULTY URINATING: 0
COUGH: 0
DIZZINESS: 0

## 2024-12-10 NOTE — PROGRESS NOTES
Subjective   Patient ID: Sofie Ulloa is a 84 y.o. female who presents for Hypertension, Diabetes (a1c), Hyperlipidemia, Results (mammogram), and Med Refill.    - Chronic sinusitis seen by otolaryngology Dr. Lito Marley recommendation to take doxycycline for 3 weeks Flonase nasal spray follow-up results closely  -Diabetes controlled hemoglobin A1c 7.2 continue low-fat diet continue low-carb diet exercise and weight loss  -CAD continue aspirin Plavix  -Chronic kidney disease stage III A stable maximize medical management low-salt diet avoid any NSAIDs.  Follow-up renal function  -Blood pressure record from home reviewed with patient range 120/70.  - Diabetes doing much better, hemoglobin A1c 7.5 continue with current medication.  - IBS continue monitoring closely symptoms are controlled now.,  Continues hide fiber diet compensated  - CHF compensated low-salt diet no leg swelling no shortness of breath continue low-salt diet  - Chronic hyponatremia under control follow-up BMP as scheduled.  -History of gastric ulcer in remission no abdominal pain no hematemesis or melena continue with current medication.  Remove that  -Hypertension controlled continue with current medication continue monitoring at home as recommended  - Hypercholesterolemia controlled continue with current medication follow-up as scheduled  3 months Medicare physical            Hypertension  Pertinent negatives include no chest pain, headaches or palpitations.   Diabetes  Pertinent negatives for hypoglycemia include no dizziness or headaches. Pertinent negatives for diabetes include no chest pain and no fatigue.   Hyperlipidemia  Pertinent negatives include no chest pain.   Med Refill  Pertinent negatives include no abdominal pain, arthralgias, chest pain, congestion, coughing, fatigue, fever, headaches, rash or sore throat.          Review of Systems   Constitutional:  Negative for fatigue, fever and unexpected weight change.   HENT:  Negative for  congestion, ear discharge, ear pain, mouth sores, sinus pain and sore throat.    Eyes:  Negative for visual disturbance.   Respiratory:  Negative for cough and wheezing.    Cardiovascular:  Negative for chest pain, palpitations and leg swelling.   Gastrointestinal:  Negative for abdominal pain, blood in stool and diarrhea.   Genitourinary:  Negative for difficulty urinating.   Musculoskeletal:  Negative for arthralgias.   Skin:  Negative for rash.   Neurological:  Negative for dizziness and headaches.   Hematological:  Does not bruise/bleed easily.   Psychiatric/Behavioral:  Negative for behavioral problems.    All other systems reviewed and are negative.      Objective   Lab Results   Component Value Date    HGBA1C 7.2 (A) 12/10/2024      /60   Pulse 51   Temp 36.5 °C (97.7 °F)   Wt 50.7 kg (111 lb 12.8 oz)   SpO2 100%   BMI 20.45 kg/m²   Lab Results   Component Value Date    WBC 8.5 05/09/2024    HGB 13.1 05/09/2024    HCT 39.7 05/09/2024     05/09/2024    CHOL 108 05/09/2024    TRIG 113 05/09/2024    HDL 32.8 05/09/2024    ALT 11 05/09/2024    AST 17 05/09/2024     (L) 05/09/2024    K 4.4 05/09/2024    CL 92 (L) 05/09/2024    CREATININE 1.08 (H) 05/09/2024    BUN 15 05/09/2024    CO2 28 05/09/2024    TSH 1.68 05/09/2024    INR 1.5 (H) 12/09/2019    HGBA1C 7.2 (A) 12/10/2024     par   Physical Exam  Vitals and nursing note reviewed.   Constitutional:       Appearance: Normal appearance.   HENT:      Head: Normocephalic.      Nose: Nose normal.   Eyes:      Conjunctiva/sclera: Conjunctivae normal.      Pupils: Pupils are equal, round, and reactive to light.   Cardiovascular:      Rate and Rhythm: Regular rhythm.   Pulmonary:      Effort: Pulmonary effort is normal.      Breath sounds: Normal breath sounds.   Abdominal:      General: Abdomen is flat.      Palpations: Abdomen is soft.   Musculoskeletal:      Cervical back: Neck supple.   Skin:     General: Skin is warm.   Neurological:       General: No focal deficit present.      Mental Status: She is oriented to person, place, and time.   Psychiatric:         Mood and Affect: Mood normal.         Assessment/Plan   Sofie was seen today for hypertension, diabetes, hyperlipidemia, results and med refill.  Diagnoses and all orders for this visit:  Diabetes mellitus type 2, noninsulin dependent (Multi) (Primary)  -     POCT fingerstick glucose manually resulted  -     POCT glycosylated hemoglobin (Hb A1C) manually resulted  Hypercholesterolemia  -     Discontinue: lovastatin (Mevacor) 20 mg tablet; Take 1 tablet (20 mg) by mouth once daily.  -     lovastatin (Mevacor) 20 mg tablet; Take 1 tablet (20 mg) by mouth once daily.  Bilateral edema of lower extremity  -     Discontinue: furosemide (Lasix) 20 mg tablet; Take 1 tablet (20 mg) by mouth once daily.  -     furosemide (Lasix) 20 mg tablet; Take 1 tablet (20 mg) by mouth once daily.  Chronic atrial fibrillation, unspecified (Multi)  Hypertension, uncontrolled  -     lisinopril 20 mg tablet; Take 1 tablet (20 mg) by mouth once daily.  Chronic rhinitis  Other orders  -     Follow Up In Primary Care - Medicare Annual; Future   - Chronic sinusitis seen by otolaryngology Dr. Lito Marley recommendation to take doxycycline for 3 weeks Flonase nasal spray follow-up results closely  -Diabetes controlled hemoglobin A1c 7.2 continue low-fat diet continue low-carb diet exercise and weight loss  -CAD continue aspirin Plavix  -Chronic kidney disease stage III A stable maximize medical management low-salt diet avoid any NSAIDs.  Follow-up renal function  -Blood pressure record from home reviewed with patient range 120/70.  - Diabetes doing much better, hemoglobin A1c 7.5 continue with current medication.  - IBS continue monitoring closely symptoms are controlled now.,  Continues hide fiber diet compensated  - CHF compensated low-salt diet no leg swelling no shortness of breath continue low-salt diet  - Chronic  hyponatremia under control follow-up BMP as scheduled.  -History of gastric ulcer in remission no abdominal pain no hematemesis or melena continue with current medication.  Remove that  -Hypertension controlled continue with current medication continue monitoring at home as recommended  - Hypercholesterolemia controlled continue with current medication follow-up as scheduled  3 months Medicare physical

## 2025-01-07 ENCOUNTER — APPOINTMENT (OUTPATIENT)
Dept: OTOLARYNGOLOGY | Facility: CLINIC | Age: 85
End: 2025-01-07
Payer: MEDICARE

## 2025-01-07 VITALS — HEIGHT: 62 IN | WEIGHT: 112 LBS | TEMPERATURE: 96.6 F | BODY MASS INDEX: 20.61 KG/M2

## 2025-01-07 DIAGNOSIS — J32.9 CHRONIC SINUSITIS, UNSPECIFIED LOCATION: Primary | ICD-10-CM

## 2025-01-07 DIAGNOSIS — J31.0 CHRONIC RHINITIS: ICD-10-CM

## 2025-01-07 DIAGNOSIS — B99.9 INFECTION: ICD-10-CM

## 2025-01-07 PROCEDURE — 1036F TOBACCO NON-USER: CPT | Performed by: OTOLARYNGOLOGY

## 2025-01-07 PROCEDURE — 1160F RVW MEDS BY RX/DR IN RCRD: CPT | Performed by: OTOLARYNGOLOGY

## 2025-01-07 PROCEDURE — 31231 NASAL ENDOSCOPY DX: CPT | Performed by: OTOLARYNGOLOGY

## 2025-01-07 PROCEDURE — 99213 OFFICE O/P EST LOW 20 MIN: CPT | Performed by: OTOLARYNGOLOGY

## 2025-01-07 PROCEDURE — 1157F ADVNC CARE PLAN IN RCRD: CPT | Performed by: OTOLARYNGOLOGY

## 2025-01-07 PROCEDURE — 1159F MED LIST DOCD IN RCRD: CPT | Performed by: OTOLARYNGOLOGY

## 2025-01-07 RX ORDER — MUPIROCIN 20 MG/G
OINTMENT TOPICAL
Qty: 22 G | Refills: 0 | Status: SHIPPED | OUTPATIENT
Start: 2025-01-07

## 2025-01-07 NOTE — PROGRESS NOTES
HPI  Sofie Ulloa is a 84 y.o. female follow-up bilateral sinusitis.  She has had dramatic improvement.  Feels great on the left.  Is not even having drainage on the right which is typically her chronic side.    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  She had previously had some benefit from mupirocin and rinse and has gotten away from this.  She is here today, however, because of left-sided purulent drainage.  She has been on doxycycline for short course of the time and gets benefit but then symptoms recur      Past Medical History:   Diagnosis Date    Coronary artery disease     Cough, unspecified 03/24/2016    Cough    Diabetes (Multi)     HTN (hypertension)     Hypo-osmolality and hyponatremia 03/01/2016    Hyponatremia    Hypo-osmolality and hyponatremia 08/19/2020    Hyponatremia    Hypomagnesemia 08/23/2022    Hypomagnesemia    Other conditions influencing health status 04/10/2013    Long QT Syndrome    Other nonspecific abnormal finding of lung field 05/05/2015    Abnormal finding on lung imaging    Pain in unspecified wrist 07/22/2015    Pain in wrist joint    Personal history of other diseases of the musculoskeletal system and connective tissue 11/30/2015    History of arthritis    Personal history of other diseases of the respiratory system 12/05/2014    History of acute bronchitis    Personal history of other diseases of the respiratory system 10/08/2014    History of sinusitis    Personal history of urinary (tract) infections 12/05/2014    History of urinary tract infection    Unspecified fracture of upper end of unspecified radius, initial encounter for closed fracture 01/14/2015    Closed comminuted fracture of proximal radius            Medications:     Current Outpatient Medications:     Accu-Chek Yaquelin Plus test strp strip, TEST BLOOD SUGAR 2 TIMES DAILY, Disp: 200 strip, Rfl:  1    Accu-Chek Softclix Lancets misc, Check blood sugar twice a day dx: e11.9, Disp: 200 each, Rfl: 1    amLODIPine (Norvasc) 5 mg tablet, Take 1 tablet (5 mg) by mouth once daily., Disp: 90 tablet, Rfl: 1    apixaban (Eliquis) 2.5 mg tablet, Take 1 tablet (2.5 mg) by mouth 2 times a day., Disp: 180 tablet, Rfl: 3    brimonidine (AlphaGAN) 0.2 % ophthalmic solution, Administer 1 drop into both eyes 2 times a day., Disp: , Rfl:     brimonidine-timoloL (Combigan) 0.2-0.5 % ophthalmic solution, Administer into affected eye(s)., Disp: , Rfl:     cholecalciferol (Vitamin D-3) 25 MCG (1000 UT) tablet, Take by mouth. TAKE AS DIRECTED., Disp: , Rfl:     clopidogrel (Plavix) 75 mg tablet, Take 1 tablet (75 mg) by mouth once daily., Disp: , Rfl:     digoxin (Lanoxin) 125 MCG tablet, Take 1 tablet (125 mcg) by mouth. On Monday Wednesday and Friday, Disp: , Rfl:     ferrous sulfate 325 (65 Fe) MG tablet, Take 1 tablet (325 mg) by mouth once daily., Disp: , Rfl:     fluticasone (Flonase) 50 mcg/actuation nasal spray, Administer 2 pumps to each nostril once daily, Disp: 16 g, Rfl: 11    furosemide (Lasix) 20 mg tablet, Take 1 tablet (20 mg) by mouth once daily., Disp: 90 tablet, Rfl: 1    isosorbide mononitrate ER (Imdur) 60 mg 24 hr tablet, Take 1 tablet (60 mg) by mouth once daily., Disp: , Rfl:     latanoprost (Xalatan) 0.005 % ophthalmic solution, Administer 1 drop into both eyes once daily at bedtime., Disp: , Rfl:     lisinopril 20 mg tablet, Take 1 tablet (20 mg) by mouth once daily., Disp: 90 tablet, Rfl: 1    lovastatin (Mevacor) 20 mg tablet, Take 1 tablet (20 mg) by mouth once daily., Disp: 90 tablet, Rfl: 1    lutein 20 mg capsule, Take 1 capsule by mouth once daily., Disp: , Rfl:     magnesium oxide 400 mg magnesium capsule, Take 1 capsule (400 mg) by mouth once daily., Disp: , Rfl:     metoprolol succinate XL (Toprol-XL) 50 mg 24 hr tablet, TAKE 1 TABLET BY MOUTH DAILY WITH MEAL WITH A MEAL, Disp: , Rfl:      "mupirocin (Bactroban) 2 % cream, Apply 1 Application topically see administration instructions. Apply as directed by physician, Disp: 30 g, Rfl: 0    pantoprazole (ProtoNix) 40 mg EC tablet, Take by mouth., Disp: , Rfl:     potassium chloride ER (Micro-K) 10 mEq ER capsule, Take 1 capsule (10 mEq) by mouth 2 times a day. Do not crush or chew., Disp: , Rfl:     timolol (Timoptic) 0.5 % ophthalmic solution, Administer 1 drop into both eyes 2 times a day., Disp: , Rfl:     Tradjenta 5 mg tablet, TAKE 1 TABLET BY MOUTH EVERY DAY, Disp: 90 tablet, Rfl: 1     Allergies:  Allergies   Allergen Reactions    Amoxicillin-Pot Clavulanate Nausea Only    Losartan Nausea Only    Sulfa (Sulfonamide Antibiotics) Nausea Only        Physical Exam:  Last Recorded Vitals  Temperature 35.9 °C (96.6 °F), height 1.575 m (5' 2\"), weight 50.8 kg (112 lb).  General:     General appearance: Well-developed, well-nourished in no acute distress.       Voice:  normal       Head/face: Normal appearance; nontender to palpation     Facial nerve function: Normal and symmetric bilaterally.    Oral/oropharynx:     Oral vestibule: Normal labial and gingival mucosa     Tongue/floor of mouth: Normal without lesion     Oropharynx: Clear.  No lesions present of the hard/soft palate, posterior pharynx    Neck:     Neck: Normal appearance, trachea midline     Salivary glands: Normal to palpation bilaterally     Lymph nodes: No cervical lymphadenopathy to palpation     Thyroid: No thyromegaly.  No palpable nodules     Range of motion: Normal    Neurological:     Cortical functions: Alert and oriented x3, appropriate affect       Larynx/hypopharynx:     Laryngeal findings: Mirror exam inadequate or limited secondary to enlarged base of tongue and/or excessive gagging    Ear:     Ear canal: Normal bilaterally     Tympanic membrane: Intact and mobile bilaterally     Pinna: Normal bilaterally     Hearing:  Gross hearing assessment normal by voice    Nose:     " Visualized using: Flexible nasal endoscopy utilized secondary to inadequate anterior rhinoscopy  Nasopharynx: Inadequate mirror examination as above, endoscopy demonstrates normal nasopharynx without obstruction or mass     Nasal dorsum: Nontraumatic midline appearance     Septum: Midline     Inferior turbinates: Normally sized     Mucosa: Right side with large antrostomy, dry crust on floor with some purulence beneath this.  Much improved.  Left side antrostomy clean with healthy maxillary sinus mucosa seen through this      ASSESSMENT/PLAN:  The left sinusitis has resolved.  The right side is back to baseline with some crusting along the floor of the sinus.  I recommended a regimen of mupirocin rinses and she will try this 1 week out of the month.  She will continue Flonase.  I will see her back in 6 months, sooner as needed with new or worsened symptoms        Lito Marley MD

## 2025-01-14 ENCOUNTER — APPOINTMENT (OUTPATIENT)
Dept: OTOLARYNGOLOGY | Facility: CLINIC | Age: 85
End: 2025-01-14
Payer: MEDICARE

## 2025-03-06 DIAGNOSIS — E11.9 DIABETES MELLITUS TYPE 2, NONINSULIN DEPENDENT (MULTI): ICD-10-CM

## 2025-03-07 RX ORDER — LINAGLIPTIN 5 MG/1
5 TABLET, FILM COATED ORAL DAILY
Qty: 90 TABLET | Refills: 0 | Status: SHIPPED | OUTPATIENT
Start: 2025-03-07

## 2025-03-11 ENCOUNTER — APPOINTMENT (OUTPATIENT)
Dept: PRIMARY CARE | Facility: CLINIC | Age: 85
End: 2025-03-11
Payer: MEDICARE

## 2025-03-11 VITALS
SYSTOLIC BLOOD PRESSURE: 120 MMHG | DIASTOLIC BLOOD PRESSURE: 70 MMHG | BODY MASS INDEX: 20.54 KG/M2 | TEMPERATURE: 97.2 F | HEIGHT: 62 IN | WEIGHT: 111.6 LBS

## 2025-03-11 DIAGNOSIS — R80.9 PROTEINURIA DUE TO TYPE 2 DIABETES MELLITUS (MULTI): ICD-10-CM

## 2025-03-11 DIAGNOSIS — E55.9 VITAMIN D DEFICIENCY, UNSPECIFIED: ICD-10-CM

## 2025-03-11 DIAGNOSIS — I48.20 CHRONIC ATRIAL FIBRILLATION, UNSPECIFIED (MULTI): ICD-10-CM

## 2025-03-11 DIAGNOSIS — I10 ESSENTIAL (PRIMARY) HYPERTENSION: ICD-10-CM

## 2025-03-11 DIAGNOSIS — E11.29 PROTEINURIA DUE TO TYPE 2 DIABETES MELLITUS (MULTI): ICD-10-CM

## 2025-03-11 DIAGNOSIS — E53.8 VITAMIN B12 DEFICIENCY: ICD-10-CM

## 2025-03-11 DIAGNOSIS — E78.00 HYPERCHOLESTEROLEMIA: ICD-10-CM

## 2025-03-11 DIAGNOSIS — I50.9 HEART FAILURE, UNSPECIFIED HF CHRONICITY, UNSPECIFIED HEART FAILURE TYPE: ICD-10-CM

## 2025-03-11 DIAGNOSIS — E11.9 DIABETES MELLITUS TYPE 2, NONINSULIN DEPENDENT (MULTI): ICD-10-CM

## 2025-03-11 DIAGNOSIS — Z00.00 ROUTINE GENERAL MEDICAL EXAMINATION AT HEALTH CARE FACILITY: Primary | ICD-10-CM

## 2025-03-11 DIAGNOSIS — N18.31 STAGE 3A CHRONIC KIDNEY DISEASE (MULTI): ICD-10-CM

## 2025-03-11 DIAGNOSIS — I10 HYPERTENSION, UNCONTROLLED: ICD-10-CM

## 2025-03-11 PROCEDURE — 1123F ACP DISCUSS/DSCN MKR DOCD: CPT | Performed by: INTERNAL MEDICINE

## 2025-03-11 PROCEDURE — 99397 PER PM REEVAL EST PAT 65+ YR: CPT | Performed by: INTERNAL MEDICINE

## 2025-03-11 PROCEDURE — 1158F ADVNC CARE PLAN TLK DOCD: CPT | Performed by: INTERNAL MEDICINE

## 2025-03-11 PROCEDURE — 1160F RVW MEDS BY RX/DR IN RCRD: CPT | Performed by: INTERNAL MEDICINE

## 2025-03-11 PROCEDURE — 1159F MED LIST DOCD IN RCRD: CPT | Performed by: INTERNAL MEDICINE

## 2025-03-11 PROCEDURE — G0439 PPPS, SUBSEQ VISIT: HCPCS | Performed by: INTERNAL MEDICINE

## 2025-03-11 PROCEDURE — 1036F TOBACCO NON-USER: CPT | Performed by: INTERNAL MEDICINE

## 2025-03-11 PROCEDURE — 1157F ADVNC CARE PLAN IN RCRD: CPT | Performed by: INTERNAL MEDICINE

## 2025-03-11 PROCEDURE — 3074F SYST BP LT 130 MM HG: CPT | Performed by: INTERNAL MEDICINE

## 2025-03-11 PROCEDURE — 3078F DIAST BP <80 MM HG: CPT | Performed by: INTERNAL MEDICINE

## 2025-03-11 PROCEDURE — 99214 OFFICE O/P EST MOD 30 MIN: CPT | Performed by: INTERNAL MEDICINE

## 2025-03-11 PROCEDURE — 1170F FXNL STATUS ASSESSED: CPT | Performed by: INTERNAL MEDICINE

## 2025-03-11 RX ORDER — LOVASTATIN 20 MG/1
20 TABLET ORAL DAILY
Qty: 90 TABLET | Refills: 1 | Status: SHIPPED | OUTPATIENT
Start: 2025-03-11

## 2025-03-11 RX ORDER — AMLODIPINE BESYLATE 5 MG/1
5 TABLET ORAL DAILY
Qty: 90 TABLET | Refills: 1 | Status: SHIPPED | OUTPATIENT
Start: 2025-03-11

## 2025-03-11 ASSESSMENT — ENCOUNTER SYMPTOMS
SINUS PAIN: 0
BRUISES/BLEEDS EASILY: 0
BLOOD IN STOOL: 0
WHEEZING: 0
FEVER: 0
HEADACHES: 0
DIARRHEA: 0
FATIGUE: 0
DIZZINESS: 0
COUGH: 0
PALPITATIONS: 0
UNEXPECTED WEIGHT CHANGE: 0
SORE THROAT: 0
DIFFICULTY URINATING: 0
ABDOMINAL PAIN: 0
ARTHRALGIAS: 0

## 2025-03-11 ASSESSMENT — ACTIVITIES OF DAILY LIVING (ADL)
DRESSING: INDEPENDENT
GROCERY_SHOPPING: INDEPENDENT
MANAGING_FINANCES: INDEPENDENT
DOING_HOUSEWORK: INDEPENDENT
BATHING: INDEPENDENT
TAKING_MEDICATION: INDEPENDENT

## 2025-03-11 NOTE — PROGRESS NOTES
Subjective   Reason for Visit: Sofie Ulloa is an 84 y.o. female here for a Medicare Wellness visit.     Past Medical, Surgical, and Family History reviewed and updated in chart.    Reviewed all medications by prescribing practitioner or clinical pharmacist (such as prescriptions, OTCs, herbal therapies and supplements) and documented in the medical record.    Medicare Annual Wellness Visit Subsequent, Diabetes, and Medication Reaction (After taking Tradjenta will get sores random all over body, getting worse the longer she takes the medication)  Annual Medicare physical and preventative visit  - Vaccination reviewed and up-to-date  - Screen for colon cancer obtained no need to repeat because of patient age  - Screening mammogram obtained in November 2020 4 repeat November 2025  - Screening for depression negative  - Advance her diet to reviewed  - Needs complete blood work in 2 months    Follow-up  - Skin rash etiology unclear possible dermatitis patient concerned about her general side effects may discontinue Tradjenta for 2 to 4 weeks to call if any improvement of her rash for further recommendation  --Diabetes controlled hemoglobin A1c 7.2 continue low-fat diet continue low-carb diet exercise and weight loss  -CAD continue aspirin Plavix  -Chronic kidney disease stage III A stable maximize medical management low-salt diet avoid any NSAIDs.  Follow-up renal function  -Blood pressure record from home reviewed with patient range 120/70.  - Diabetes doing much better, hemoglobin A1c 7.5 continue with current medication.  - IBS continue monitoring closely symptoms are controlled now.,  Continues hide fiber diet compensated  - CHF compensated low-salt diet no leg swelling no shortness of breath continue low-salt diet  - Chronic hyponatremia under control follow-up BMP as scheduled.  -History of gastric ulcer in remission no abdominal pain no hematemesis or melena continue with current medication.  Remove  "that  -Hypertension controlled continue with current medication continue monitoring at home as recommended  - Hypercholesterolemia controlled continue with current medication follow-up as scheduled  Follow-up 3 months      Diabetes  Pertinent negatives for hypoglycemia include no dizziness or headaches. Pertinent negatives for diabetes include no chest pain and no fatigue.       Patient Care Team:  Eve Gonzalez MD as PCP - General (Internal Medicine)  Eve Gonzalez MD     Review of Systems   Constitutional:  Negative for fatigue, fever and unexpected weight change.   HENT:  Negative for congestion, ear discharge, ear pain, mouth sores, sinus pain and sore throat.    Eyes:  Negative for visual disturbance.   Respiratory:  Negative for cough and wheezing.    Cardiovascular:  Negative for chest pain, palpitations and leg swelling.   Gastrointestinal:  Negative for abdominal pain, blood in stool and diarrhea.   Genitourinary:  Negative for difficulty urinating.   Musculoskeletal:  Negative for arthralgias.   Skin:  Positive for rash.   Neurological:  Negative for dizziness and headaches.   Hematological:  Does not bruise/bleed easily.   Psychiatric/Behavioral:  Negative for behavioral problems.    All other systems reviewed and are negative.    Lab Results   Component Value Date    WBC 8.5 05/09/2024    HGB 13.1 05/09/2024    HCT 39.7 05/09/2024     05/09/2024    CHOL 108 05/09/2024    TRIG 113 05/09/2024    HDL 32.8 05/09/2024    ALT 11 05/09/2024    AST 17 05/09/2024     (L) 05/09/2024    K 4.4 05/09/2024    CL 92 (L) 05/09/2024    CREATININE 1.08 (H) 05/09/2024    BUN 15 05/09/2024    CO2 28 05/09/2024    TSH 1.68 05/09/2024    INR 1.5 (H) 12/09/2019    HGBA1C 7.2 (A) 12/10/2024     par   Objective   Vitals:  /70   Temp 36.2 °C (97.2 °F)   Ht 1.575 m (5' 2\")   Wt 50.6 kg (111 lb 9.6 oz)   BMI 20.41 kg/m²       Physical Exam  Vitals and nursing note reviewed.   Constitutional:       " Appearance: Normal appearance.   HENT:      Head: Normocephalic.      Nose: Nose normal.   Eyes:      Conjunctiva/sclera: Conjunctivae normal.      Pupils: Pupils are equal, round, and reactive to light.   Cardiovascular:      Rate and Rhythm: Regular rhythm.   Pulmonary:      Effort: Pulmonary effort is normal.      Breath sounds: Normal breath sounds.   Abdominal:      General: Abdomen is flat.      Palpations: Abdomen is soft.   Musculoskeletal:      Cervical back: Neck supple.   Skin:     General: Skin is warm.      Findings: Rash (On both legs and knees papular) present.   Neurological:      General: No focal deficit present.      Mental Status: She is oriented to person, place, and time.   Psychiatric:         Mood and Affect: Mood normal.         Assessment & Plan  Essential (primary) hypertension    Orders:    amLODIPine (Norvasc) 5 mg tablet; Take 1 tablet (5 mg) by mouth once daily.    CBC and Auto Differential; Future    Comprehensive Metabolic Panel; Future    Hypercholesterolemia    Orders:    lovastatin (Mevacor) 20 mg tablet; Take 1 tablet (20 mg) by mouth once daily.    Routine general medical examination at health care facility    Orders:    1 Year Follow Up In Primary Care - Wellness Exam; Future    Lipid Panel; Future    TSH with reflex to Free T4 if abnormal; Future    Chronic atrial fibrillation, unspecified (Multi)         Diabetes mellitus type 2, noninsulin dependent (Multi)    Orders:    Albumin-Creatinine Ratio, Urine Random; Future    Comprehensive Metabolic Panel; Future    Hemoglobin A1C; Future    Hypertension, uncontrolled         Stage 3a chronic kidney disease (Multi)         Vitamin B12 deficiency         Vitamin D deficiency, unspecified    Orders:    Vitamin D 25-Hydroxy,Total (for eval of Vitamin D levels); Future    Heart failure, unspecified HF chronicity, unspecified heart failure type         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 (Multi)          Medicare Annual  Wellness Visit Subsequent, Diabetes, and Medication Reaction (After taking Tradjenta will get sores random all over body, getting worse the longer she takes the medication)  Annual Medicare physical and preventative visit  - Vaccination reviewed and up-to-date  - Screen for colon cancer obtained no need to repeat because of patient age  - Screening mammogram obtained in November 2020 4 repeat November 2025  - Screening for depression negative  - Advance her diet to reviewed  - Needs complete blood work in 2 months    Follow-up  - Skin rash etiology unclear possible dermatitis patient concerned about her general side effects may discontinue Tradjenta for 2 to 4 weeks to call if any improvement of her rash for further recommendation  --Diabetes controlled hemoglobin A1c 7.2 continue low-fat diet continue low-carb diet exercise and weight loss  -CAD continue aspirin Plavix  -Chronic kidney disease stage III A stable maximize medical management low-salt diet avoid any NSAIDs.  Follow-up renal function  -Blood pressure record from home reviewed with patient range 120/70.  - Diabetes doing much better, hemoglobin A1c 7.5 continue with current medication.  - IBS continue monitoring closely symptoms are controlled now.,  Continues hide fiber diet compensated  - CHF compensated low-salt diet no leg swelling no shortness of breath continue low-salt diet  - Chronic hyponatremia under control follow-up BMP as scheduled.  -History of gastric ulcer in remission no abdominal pain no hematemesis or melena continue with current medication.  Remove that  -Hypertension controlled continue with current medication continue monitoring at home as recommended  - Hypercholesterolemia controlled continue with current medication follow-up as scheduled  Follow-up 3 months

## 2025-03-11 NOTE — PROGRESS NOTES
"Subjective   Patient ID: Sofie Ulloa is a 84 y.o. female who presents for Medicare Annual Wellness Visit Subsequent, Diabetes, and Medication Reaction (After taking Tradjenta will get sores random all over body, getting worse the longer she takes the medication).    HPI       Review of Systems    Objective   Lab Results   Component Value Date    HGBA1C 7.2 (A) 12/10/2024      /72   Temp 36.2 °C (97.2 °F)   Ht 1.575 m (5' 2\")   Wt 50.6 kg (111 lb 9.6 oz)   BMI 20.41 kg/m²     Physical Exam    Assessment/Plan   Sofie was seen today for medicare annual wellness visit subsequent, diabetes and medication reaction.  Diagnoses and all orders for this visit:  Essential (primary) hypertension  -     amLODIPine (Norvasc) 5 mg tablet; Take 1 tablet (5 mg) by mouth once daily.  Hypercholesterolemia  -     lovastatin (Mevacor) 20 mg tablet; Take 1 tablet (20 mg) by mouth once daily.  Other orders  -     Follow Up In Primary Care - Medicare Annual     "

## 2025-03-11 NOTE — ASSESSMENT & PLAN NOTE
Orders:    Albumin-Creatinine Ratio, Urine Random; Future    Comprehensive Metabolic Panel; Future    Hemoglobin A1C; Future

## 2025-03-18 ENCOUNTER — TELEPHONE (OUTPATIENT)
Dept: OTOLARYNGOLOGY | Facility: CLINIC | Age: 85
End: 2025-03-18
Payer: MEDICARE

## 2025-03-18 DIAGNOSIS — B99.9 INFECTION: Primary | ICD-10-CM

## 2025-03-18 RX ORDER — DOXYCYCLINE 100 MG/1
100 CAPSULE ORAL 2 TIMES DAILY
Qty: 20 CAPSULE | Refills: 0 | Status: SHIPPED | OUTPATIENT
Start: 2025-03-18 | End: 2025-03-28

## 2025-03-18 NOTE — TELEPHONE ENCOUNTER
Last seen 01/07/2025. For about the last month she has been getting a dark yellow drainage from the left nostril. She has been doing the mupirocin rinses and flonase. She is wondering if t=you could call in doxycycline?  She is allergic to augmentin, sulfa, losartan.

## 2025-03-31 ENCOUNTER — TELEPHONE (OUTPATIENT)
Dept: PRIMARY CARE | Facility: CLINIC | Age: 85
End: 2025-03-31
Payer: MEDICARE

## 2025-03-31 NOTE — TELEPHONE ENCOUNTER
Patient stopped the Trajenta and the itchy skin spots have improved.    Please advise if you'd like to replace the Trajenta with a new medication?

## 2025-04-01 DIAGNOSIS — E87.6 HYPOKALEMIA: ICD-10-CM

## 2025-04-16 LAB
ANION GAP SERPL CALCULATED.4IONS-SCNC: 10 MMOL/L (CALC) (ref 7–17)
BUN SERPL-MCNC: 13 MG/DL (ref 7–25)
BUN/CREAT SERPL: 13 (CALC) (ref 6–22)
CALCIUM SERPL-MCNC: 9.7 MG/DL (ref 8.6–10.4)
CHLORIDE SERPL-SCNC: 92 MMOL/L (ref 98–110)
CO2 SERPL-SCNC: 26 MMOL/L (ref 20–32)
CREAT SERPL-MCNC: 1.01 MG/DL (ref 0.6–0.95)
EGFRCR SERPLBLD CKD-EPI 2021: 55 ML/MIN/1.73M2
GLUCOSE SERPL-MCNC: 193 MG/DL (ref 65–99)
POTASSIUM SERPL-SCNC: 4.8 MMOL/L (ref 3.5–5.3)
SODIUM SERPL-SCNC: 128 MMOL/L (ref 135–146)

## 2025-04-24 ENCOUNTER — APPOINTMENT (OUTPATIENT)
Dept: PRIMARY CARE | Facility: CLINIC | Age: 85
End: 2025-04-24
Payer: MEDICARE

## 2025-04-24 VITALS
OXYGEN SATURATION: 99 % | HEART RATE: 67 BPM | TEMPERATURE: 96.9 F | WEIGHT: 111.2 LBS | SYSTOLIC BLOOD PRESSURE: 120 MMHG | BODY MASS INDEX: 20.34 KG/M2 | DIASTOLIC BLOOD PRESSURE: 70 MMHG

## 2025-04-24 DIAGNOSIS — I10 ESSENTIAL (PRIMARY) HYPERTENSION: ICD-10-CM

## 2025-04-24 DIAGNOSIS — E11.29 PROTEINURIA DUE TO TYPE 2 DIABETES MELLITUS (MULTI): ICD-10-CM

## 2025-04-24 DIAGNOSIS — R80.9 PROTEINURIA DUE TO TYPE 2 DIABETES MELLITUS (MULTI): ICD-10-CM

## 2025-04-24 DIAGNOSIS — N18.31 STAGE 3A CHRONIC KIDNEY DISEASE (MULTI): ICD-10-CM

## 2025-04-24 DIAGNOSIS — E55.9 VITAMIN D DEFICIENCY, UNSPECIFIED: ICD-10-CM

## 2025-04-24 DIAGNOSIS — L30.9 DERMATITIS: Primary | ICD-10-CM

## 2025-04-24 DIAGNOSIS — I48.20 CHRONIC ATRIAL FIBRILLATION, UNSPECIFIED (MULTI): ICD-10-CM

## 2025-04-24 PROCEDURE — 1159F MED LIST DOCD IN RCRD: CPT | Performed by: INTERNAL MEDICINE

## 2025-04-24 PROCEDURE — 3074F SYST BP LT 130 MM HG: CPT | Performed by: INTERNAL MEDICINE

## 2025-04-24 PROCEDURE — 3078F DIAST BP <80 MM HG: CPT | Performed by: INTERNAL MEDICINE

## 2025-04-24 PROCEDURE — 99214 OFFICE O/P EST MOD 30 MIN: CPT | Performed by: INTERNAL MEDICINE

## 2025-04-24 PROCEDURE — G2211 COMPLEX E/M VISIT ADD ON: HCPCS | Performed by: INTERNAL MEDICINE

## 2025-04-24 PROCEDURE — 1157F ADVNC CARE PLAN IN RCRD: CPT | Performed by: INTERNAL MEDICINE

## 2025-04-24 RX ORDER — POTASSIUM CHLORIDE 750 MG/1
10 CAPSULE, EXTENDED RELEASE ORAL DAILY
COMMUNITY
Start: 2025-04-24

## 2025-04-24 ASSESSMENT — ENCOUNTER SYMPTOMS
ARTHRALGIAS: 0
DIZZINESS: 0
DIFFICULTY URINATING: 0
UNEXPECTED WEIGHT CHANGE: 0
WHEEZING: 0
PALPITATIONS: 0
BLOOD IN STOOL: 0
HEADACHES: 0
SINUS PAIN: 0
BRUISES/BLEEDS EASILY: 0
FATIGUE: 0
SORE THROAT: 0
COUGH: 0
DIARRHEA: 0
ABDOMINAL PAIN: 0
FEVER: 0

## 2025-04-24 NOTE — PROGRESS NOTES
Could just now shower and  Just finished paperwork family bring the other 1115 firstSubjective   Patient ID: Sofie Ulloa is a 84 y.o. female who presents for Results (BW, 2 week follow ).    Recent blood work and plan of care reviewed  -Dermatitis and skin rash resolved with patient off Tradjenta now symptoms improved  May resume Tradjenta again if any recurrence of rash to call immediately otherwise we will see patient in 3 months  - Chronic hyponatremia stable  - Hypertension controlled record from home reviewed range 120/70  --Diabetes controlled hemoglobin A1c 7.2 continue low-fat diet continue low-carb diet exercise and weight loss  -CAD continue aspirin Plavix  -Chronic kidney disease stage III A stable maximize medical management low-salt diet avoid any NSAIDs.  Follow-up renal function  -Blood pressure record from home reviewed with patient range 120/70.  - Diabetes doing much better, hemoglobin A1c 7.5 continue with current medication.  - IBS continue monitoring closely symptoms are controlled now.,  Continues hide fiber diet compensated  - CHF compensated low-salt diet no leg swelling no shortness of breath continue low-salt diet  --History of gastric ulcer in remission no abdominal pain no hematemesis or melena continue with current medication.  Remove that  -Hypertension controlled continue with current medication continue monitoring at home as recommended  - Hypercholesterolemia controlled continue with current medication follow-up as scheduled  Follow-up 3 months                Review of Systems   Constitutional:  Negative for fatigue, fever and unexpected weight change.   HENT:  Negative for congestion, ear discharge, ear pain, mouth sores, sinus pain and sore throat.    Eyes:  Negative for visual disturbance.   Respiratory:  Negative for cough and wheezing.    Cardiovascular:  Negative for chest pain, palpitations and leg swelling.   Gastrointestinal:  Negative for abdominal pain, blood in stool and  diarrhea.   Genitourinary:  Negative for difficulty urinating.   Musculoskeletal:  Negative for arthralgias.   Skin:  Negative for rash.   Neurological:  Negative for dizziness and headaches.   Hematological:  Does not bruise/bleed easily.   Psychiatric/Behavioral:  Negative for behavioral problems.    All other systems reviewed and are negative.      Objective   Lab Results   Component Value Date    HGBA1C 7.2 (A) 12/10/2024      /70   Pulse 67   Temp 36.1 °C (96.9 °F)   Wt 50.4 kg (111 lb 3.2 oz)   SpO2 99%   BMI 20.34 kg/m²   Lab Results   Component Value Date    WBC 8.5 05/09/2024    HGB 13.1 05/09/2024    HCT 39.7 05/09/2024     05/09/2024    CHOL 108 05/09/2024    TRIG 113 05/09/2024    HDL 32.8 05/09/2024    ALT 11 05/09/2024    AST 17 05/09/2024     (L) 04/15/2025    K 4.8 04/15/2025    CL 92 (L) 04/15/2025    CREATININE 1.01 (H) 04/15/2025    BUN 13 04/15/2025    CO2 26 04/15/2025    TSH 1.68 05/09/2024    INR 1.5 (H) 12/09/2019    HGBA1C 7.2 (A) 12/10/2024     par   Physical Exam  Vitals and nursing note reviewed.   Constitutional:       Appearance: Normal appearance.   HENT:      Head: Normocephalic.      Nose: Nose normal.   Eyes:      Conjunctiva/sclera: Conjunctivae normal.      Pupils: Pupils are equal, round, and reactive to light.   Cardiovascular:      Rate and Rhythm: Regular rhythm.   Pulmonary:      Effort: Pulmonary effort is normal.      Breath sounds: Normal breath sounds.   Abdominal:      General: Abdomen is flat.      Palpations: Abdomen is soft.   Musculoskeletal:      Cervical back: Neck supple.   Skin:     General: Skin is warm.   Neurological:      General: No focal deficit present.      Mental Status: She is oriented to person, place, and time.   Psychiatric:         Mood and Affect: Mood normal.         Assessment/Plan   Sofie was seen today for results.  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 (Multi)   Recent blood work and plan of care reviewed  -Dermatitis and skin rash resolved with patient off Tradjenta now symptoms improved  May resume Tradjenta again if any recurrence of rash to call immediately otherwise we will see patient in 3 months  - Chronic hyponatremia stable  - Hypertension controlled record from home reviewed range 120/70  --Diabetes controlled hemoglobin A1c 7.2 continue low-fat diet continue low-carb diet exercise and weight loss  -CAD continue aspirin Plavix  -Chronic kidney disease stage III A stable maximize medical management low-salt diet avoid any NSAIDs.  Follow-up renal function  -Blood pressure record from home reviewed with patient range 120/70.  - Diabetes doing much better, hemoglobin A1c 7.5 continue with current medication.  - IBS continue monitoring closely symptoms are controlled now.,  Continues hide fiber diet compensated  - CHF compensated low-salt diet no leg swelling no shortness of breath continue low-salt diet  --History of gastric ulcer in remission no abdominal pain no hematemesis or melena continue with current medication.  Remove that  -Hypertension controlled continue with current medication continue monitoring at home as recommended  - Hypercholesterolemia controlled continue with current medication follow-up as scheduled  Follow-up 3 months

## 2025-06-06 DIAGNOSIS — E11.9 TYPE 2 DIABETES MELLITUS WITHOUT COMPLICATIONS: ICD-10-CM

## 2025-06-06 DIAGNOSIS — I10 HYPERTENSION, UNCONTROLLED: ICD-10-CM

## 2025-06-09 RX ORDER — BLOOD SUGAR DIAGNOSTIC
STRIP MISCELLANEOUS
Qty: 200 STRIP | Refills: 1 | Status: SHIPPED | OUTPATIENT
Start: 2025-06-09

## 2025-06-09 RX ORDER — LISINOPRIL 20 MG/1
20 TABLET ORAL DAILY
Qty: 90 TABLET | Refills: 0 | Status: SHIPPED | OUTPATIENT
Start: 2025-06-09

## 2025-06-11 ENCOUNTER — APPOINTMENT (OUTPATIENT)
Dept: PRIMARY CARE | Facility: CLINIC | Age: 85
End: 2025-06-11
Payer: MEDICARE

## 2025-06-18 LAB
25(OH)D3+25(OH)D2 SERPL-MCNC: 48 NG/ML (ref 30–100)
ALBUMIN SERPL-MCNC: 4.8 G/DL (ref 3.6–5.1)
ALBUMIN/CREAT UR: 260 MG/G CREAT
ALP SERPL-CCNC: 66 U/L (ref 37–153)
ALT SERPL-CCNC: 11 U/L (ref 6–29)
ANION GAP SERPL CALCULATED.4IONS-SCNC: 11 MMOL/L (CALC) (ref 7–17)
AST SERPL-CCNC: 16 U/L (ref 10–35)
BASOPHILS # BLD AUTO: 78 CELLS/UL (ref 0–200)
BASOPHILS NFR BLD AUTO: 1.1 %
BILIRUB SERPL-MCNC: 0.8 MG/DL (ref 0.2–1.2)
BUN SERPL-MCNC: 12 MG/DL (ref 7–25)
CALCIUM SERPL-MCNC: 9.6 MG/DL (ref 8.6–10.4)
CHLORIDE SERPL-SCNC: 90 MMOL/L (ref 98–110)
CHOLEST SERPL-MCNC: 113 MG/DL
CHOLEST/HDLC SERPL: 2.8 (CALC)
CO2 SERPL-SCNC: 25 MMOL/L (ref 20–32)
CREAT SERPL-MCNC: 0.92 MG/DL (ref 0.6–0.95)
CREAT UR-MCNC: 20 MG/DL (ref 20–275)
EGFRCR SERPLBLD CKD-EPI 2021: 61 ML/MIN/1.73M2
EOSINOPHIL # BLD AUTO: 128 CELLS/UL (ref 15–500)
EOSINOPHIL NFR BLD AUTO: 1.8 %
ERYTHROCYTE [DISTWIDTH] IN BLOOD BY AUTOMATED COUNT: 13.8 % (ref 11–15)
EST. AVERAGE GLUCOSE BLD GHB EST-MCNC: 151 MG/DL
EST. AVERAGE GLUCOSE BLD GHB EST-SCNC: 8.4 MMOL/L
GLUCOSE SERPL-MCNC: 177 MG/DL (ref 65–99)
HBA1C MFR BLD: 6.9 %
HCT VFR BLD AUTO: 40.6 % (ref 35–45)
HDLC SERPL-MCNC: 41 MG/DL
HGB BLD-MCNC: 12.8 G/DL (ref 11.7–15.5)
LDLC SERPL CALC-MCNC: 52 MG/DL (CALC)
LYMPHOCYTES # BLD AUTO: 1221 CELLS/UL (ref 850–3900)
LYMPHOCYTES NFR BLD AUTO: 17.2 %
MCH RBC QN AUTO: 30.1 PG (ref 27–33)
MCHC RBC AUTO-ENTMCNC: 31.5 G/DL (ref 32–36)
MCV RBC AUTO: 95.5 FL (ref 80–100)
MICROALBUMIN UR-MCNC: 5.2 MG/DL
MONOCYTES # BLD AUTO: 710 CELLS/UL (ref 200–950)
MONOCYTES NFR BLD AUTO: 10 %
NEUTROPHILS # BLD AUTO: 4963 CELLS/UL (ref 1500–7800)
NEUTROPHILS NFR BLD AUTO: 69.9 %
NONHDLC SERPL-MCNC: 72 MG/DL (CALC)
PLATELET # BLD AUTO: 218 THOUSAND/UL (ref 140–400)
PMV BLD REES-ECKER: 8.4 FL (ref 7.5–12.5)
POTASSIUM SERPL-SCNC: 4.4 MMOL/L (ref 3.5–5.3)
PROT SERPL-MCNC: 7.9 G/DL (ref 6.1–8.1)
RBC # BLD AUTO: 4.25 MILLION/UL (ref 3.8–5.1)
SODIUM SERPL-SCNC: 126 MMOL/L (ref 135–146)
TRIGL SERPL-MCNC: 113 MG/DL
TSH SERPL-ACNC: 1.76 MIU/L (ref 0.4–4.5)
WBC # BLD AUTO: 7.1 THOUSAND/UL (ref 3.8–10.8)

## 2025-06-30 ENCOUNTER — APPOINTMENT (OUTPATIENT)
Dept: PRIMARY CARE | Facility: CLINIC | Age: 85
End: 2025-06-30
Payer: MEDICARE

## 2025-07-08 ENCOUNTER — APPOINTMENT (OUTPATIENT)
Dept: OTOLARYNGOLOGY | Facility: CLINIC | Age: 85
End: 2025-07-08
Payer: MEDICARE

## 2025-07-08 VITALS — BODY MASS INDEX: 20.06 KG/M2 | HEIGHT: 62 IN | TEMPERATURE: 96.9 F | WEIGHT: 109 LBS

## 2025-07-08 DIAGNOSIS — J32.9 CHRONIC SINUSITIS, UNSPECIFIED LOCATION: Primary | ICD-10-CM

## 2025-07-08 DIAGNOSIS — J31.0 CHRONIC RHINITIS: ICD-10-CM

## 2025-07-08 DIAGNOSIS — B99.9 INFECTION: ICD-10-CM

## 2025-07-08 PROCEDURE — 1036F TOBACCO NON-USER: CPT | Performed by: OTOLARYNGOLOGY

## 2025-07-08 PROCEDURE — 99214 OFFICE O/P EST MOD 30 MIN: CPT | Performed by: OTOLARYNGOLOGY

## 2025-07-08 PROCEDURE — 1159F MED LIST DOCD IN RCRD: CPT | Performed by: OTOLARYNGOLOGY

## 2025-07-08 PROCEDURE — 1160F RVW MEDS BY RX/DR IN RCRD: CPT | Performed by: OTOLARYNGOLOGY

## 2025-07-08 PROCEDURE — 31231 NASAL ENDOSCOPY DX: CPT | Performed by: OTOLARYNGOLOGY

## 2025-07-08 RX ORDER — DOXYCYCLINE 100 MG/1
100 CAPSULE ORAL 2 TIMES DAILY
Qty: 42 CAPSULE | Refills: 0 | Status: SHIPPED | OUTPATIENT
Start: 2025-07-08 | End: 2025-07-29

## 2025-07-08 NOTE — PROGRESS NOTES
HPI  Sofie Ulloa is a 85 y.o. female follow-up bilateral sinusitis.  Last seen 6 months ago, treated with antibiotics and did very well at that time.  She has been using irrigation and fluticasone and did well for a few months but now is having some drainage again bilaterally.  She denies pain.      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  She had previously had some benefit from mupirocin and rinse and has gotten away from this.  She is here today, however, because of left-sided purulent drainage.  She has been on doxycycline for short course of the time and gets benefit but then symptoms recur      Past Medical History:   Diagnosis Date    Coronary artery disease     Cough, unspecified 03/24/2016    Cough    Diabetes (Multi)     HTN (hypertension)     Hypo-osmolality and hyponatremia 03/01/2016    Hyponatremia    Hypo-osmolality and hyponatremia 08/19/2020    Hyponatremia    Hypomagnesemia 08/23/2022    Hypomagnesemia    Other conditions influencing health status 04/10/2013    Long QT Syndrome    Other nonspecific abnormal finding of lung field 05/05/2015    Abnormal finding on lung imaging    Pain in unspecified wrist 07/22/2015    Pain in wrist joint    Personal history of other diseases of the musculoskeletal system and connective tissue 11/30/2015    History of arthritis    Personal history of other diseases of the respiratory system 12/05/2014    History of acute bronchitis    Personal history of other diseases of the respiratory system 10/08/2014    History of sinusitis    Personal history of urinary (tract) infections 12/05/2014    History of urinary tract infection    Unspecified fracture of upper end of unspecified radius, initial encounter for closed fracture 01/14/2015    Closed comminuted fracture of proximal radius            Medications:     Current Outpatient Medications:      Please bring back last 2 Ant[employer] forms    Accu-Chek Yaquelin Plus test strp, TEST BLOOD SUGAR 2 TIMES DAILY, Disp: 200 strip, Rfl: 1    Accu-Chek Softclix Lancets misc, Check blood sugar twice a day dx: e11.9, Disp: 200 each, Rfl: 1    amLODIPine (Norvasc) 5 mg tablet, Take 1 tablet (5 mg) by mouth once daily., Disp: 90 tablet, Rfl: 1    apixaban (Eliquis) 2.5 mg tablet, Take 1 tablet (2.5 mg) by mouth 2 times a day., Disp: 180 tablet, Rfl: 3    brimonidine (AlphaGAN) 0.2 % ophthalmic solution, Administer 1 drop into both eyes 2 times a day., Disp: , Rfl:     brimonidine-timoloL (Combigan) 0.2-0.5 % ophthalmic solution, Administer into affected eye(s)., Disp: , Rfl:     cholecalciferol (Vitamin D-3) 25 MCG (1000 UT) tablet, Take by mouth. TAKE AS DIRECTED., Disp: , Rfl:     clopidogrel (Plavix) 75 mg tablet, Take 1 tablet (75 mg) by mouth once daily., Disp: , Rfl:     digoxin (Lanoxin) 125 MCG tablet, Take 1 tablet (125 mcg) by mouth. On Monday Wednesday and Friday, Disp: , Rfl:     ferrous sulfate 325 (65 Fe) MG tablet, Take 1 tablet by mouth once daily., Disp: , Rfl:     fluticasone (Flonase) 50 mcg/actuation nasal spray, Administer 2 pumps to each nostril once daily, Disp: 16 g, Rfl: 11    furosemide (Lasix) 20 mg tablet, Take 1 tablet (20 mg) by mouth once daily., Disp: 90 tablet, Rfl: 1    isosorbide mononitrate ER (Imdur) 60 mg 24 hr tablet, Take 1 tablet (60 mg) by mouth once daily., Disp: , Rfl:     latanoprost (Xalatan) 0.005 % ophthalmic solution, Administer 1 drop into both eyes once daily at bedtime., Disp: , Rfl:     lisinopril 20 mg tablet, TAKE 1 TABLET BY MOUTH EVERY DAY, Disp: 90 tablet, Rfl: 0    lovastatin (Mevacor) 20 mg tablet, Take 1 tablet (20 mg) by mouth once daily., Disp: 90 tablet, Rfl: 1    lutein 20 mg capsule, Take 1 capsule by mouth once daily., Disp: , Rfl:     magnesium oxide 400 mg magnesium capsule, Take 1 capsule (400 mg) by mouth once daily., Disp: , Rfl:     metoprolol succinate XL (Toprol-XL) 50 mg 24 hr tablet, TAKE 1  "TABLET BY MOUTH DAILY WITH MEAL WITH A MEAL, Disp: , Rfl:     mupirocin (Bactroban) 2 % ointment, APPLY TO NOSE AS DIRECTED TWICE DAILY X 7 DAYS, Disp: 22 g, Rfl: 0    pantoprazole (ProtoNix) 40 mg EC tablet, Take by mouth., Disp: , Rfl:     potassium chloride ER (Micro-K) 10 mEq ER capsule, Take 1 capsule (10 mEq) by mouth once daily. Do not crush or chew., Disp: , Rfl:     timolol (Timoptic) 0.5 % ophthalmic solution, Administer 1 drop into both eyes 2 times a day., Disp: , Rfl:     Tradjenta 5 mg tablet, TAKE 1 TABLET BY MOUTH EVERY DAY, Disp: 90 tablet, Rfl: 0    mupirocin (Bactroban) 2 % cream, Apply 1 Application topically see administration instructions. Apply as directed by physician (Patient not taking: Reported on 7/8/2025), Disp: 30 g, Rfl: 0     Allergies:  Allergies   Allergen Reactions    Amoxicillin-Pot Clavulanate Nausea Only    Losartan Nausea Only    Sulfa (Sulfonamide Antibiotics) Nausea Only        Physical Exam:  Last Recorded Vitals  Temperature 36.1 °C (96.9 °F), height 1.575 m (5' 2\"), weight 49.4 kg (109 lb).  General:     General appearance: Well-developed, well-nourished in no acute distress.       Voice:  normal       Head/face: Normal appearance; nontender to palpation     Facial nerve function: Normal and symmetric bilaterally.    Oral/oropharynx:     Oral vestibule: Normal labial and gingival mucosa     Tongue/floor of mouth: Normal without lesion     Oropharynx: Clear.  No lesions present of the hard/soft palate, posterior pharynx    Neck:     Neck: Normal appearance, trachea midline     Salivary glands: Normal to palpation bilaterally     Lymph nodes: No cervical lymphadenopathy to palpation     Thyroid: No thyromegaly.  No palpable nodules     Range of motion: Normal    Neurological:     Cortical functions: Alert and oriented x3, appropriate affect       Larynx/hypopharynx:     Laryngeal findings: Mirror exam inadequate or limited secondary to enlarged base of tongue and/or " excessive gagging    Ear:     Ear canal: Normal bilaterally     Tympanic membrane: Intact and mobile bilaterally     Pinna: Normal bilaterally     Hearing:  Gross hearing assessment normal by voice    Nose:     Visualized using: Flexible nasal endoscopy utilized secondary to inadequate anterior rhinoscopy  Nasopharynx: Inadequate mirror examination as above, endoscopy demonstrates normal nasopharynx without obstruction or mass     Nasal dorsum: Nontraumatic midline appearance     Septum: Midline     Inferior turbinates: Normally sized     Mucosa: Right side with large antrostomy, dry crust on floor with some purulence beneath this.  Left side smaller antrostomy with purulence in the floor    ASSESSMENT/PLAN:  We will treat her bilateral sinusitis today with doxycycline.  She will continue her irrigation with and without mupirocin per her regimen.  She will use fluticasone daily.  I will see her back in 6 months, sooner as needed.  We did discuss further treatment options including revision surgery but have agreed to continue to temper her symptoms with intermittent antibiotics and conservative measures        Lito Marley MD

## 2025-07-10 ENCOUNTER — APPOINTMENT (OUTPATIENT)
Dept: PRIMARY CARE | Facility: CLINIC | Age: 85
End: 2025-07-10
Payer: MEDICARE

## 2025-07-10 VITALS
BODY MASS INDEX: 19.86 KG/M2 | TEMPERATURE: 97.7 F | SYSTOLIC BLOOD PRESSURE: 135 MMHG | DIASTOLIC BLOOD PRESSURE: 80 MMHG | HEART RATE: 67 BPM | WEIGHT: 108.6 LBS | OXYGEN SATURATION: 100 %

## 2025-07-10 DIAGNOSIS — I50.40 COMBINED SYSTOLIC AND DIASTOLIC CONGESTIVE HEART FAILURE, NYHA CLASS 2, UNSPECIFIED CONGESTIVE HEART FAILURE CHRONICITY: ICD-10-CM

## 2025-07-10 DIAGNOSIS — E78.00 HYPERCHOLESTEROLEMIA: ICD-10-CM

## 2025-07-10 DIAGNOSIS — I10 HYPERTENSION, UNCONTROLLED: ICD-10-CM

## 2025-07-10 DIAGNOSIS — E46 PROTEIN-CALORIE MALNUTRITION, UNSPECIFIED SEVERITY (MULTI): ICD-10-CM

## 2025-07-10 DIAGNOSIS — E22.2 SIADH (SYNDROME OF INAPPROPRIATE ADH PRODUCTION) (MULTI): Primary | ICD-10-CM

## 2025-07-10 DIAGNOSIS — I48.0 PAROXYSMAL ATRIAL FIBRILLATION (MULTI): ICD-10-CM

## 2025-07-10 PROCEDURE — 3079F DIAST BP 80-89 MM HG: CPT | Performed by: INTERNAL MEDICINE

## 2025-07-10 PROCEDURE — 1160F RVW MEDS BY RX/DR IN RCRD: CPT | Performed by: INTERNAL MEDICINE

## 2025-07-10 PROCEDURE — 3075F SYST BP GE 130 - 139MM HG: CPT | Performed by: INTERNAL MEDICINE

## 2025-07-10 PROCEDURE — G2211 COMPLEX E/M VISIT ADD ON: HCPCS | Performed by: INTERNAL MEDICINE

## 2025-07-10 PROCEDURE — 1159F MED LIST DOCD IN RCRD: CPT | Performed by: INTERNAL MEDICINE

## 2025-07-10 PROCEDURE — 99214 OFFICE O/P EST MOD 30 MIN: CPT | Performed by: INTERNAL MEDICINE

## 2025-07-10 PROCEDURE — 1036F TOBACCO NON-USER: CPT | Performed by: INTERNAL MEDICINE

## 2025-07-10 RX ORDER — ISOSORBIDE MONONITRATE 120 MG/1
120 TABLET, EXTENDED RELEASE ORAL DAILY
Qty: 90 TABLET | Refills: 0 | Status: SHIPPED | OUTPATIENT
Start: 2025-07-10

## 2025-07-10 ASSESSMENT — ENCOUNTER SYMPTOMS
DIFFICULTY URINATING: 0
HYPERTENSION: 1
UNEXPECTED WEIGHT CHANGE: 0
HEADACHES: 0
FEVER: 0
PALPITATIONS: 0
BLOOD IN STOOL: 0
DIARRHEA: 0
WHEEZING: 0
ARTHRALGIAS: 0
DIZZINESS: 0
COUGH: 0
FATIGUE: 1
SINUS PAIN: 0
BRUISES/BLEEDS EASILY: 0
ABDOMINAL PAIN: 0
SORE THROAT: 0

## 2025-07-10 NOTE — PROGRESS NOTES
Subjective   Patient ID: Sofie Ulloa is a 85 y.o. female who presents for Diabetes, Hyperlipidemia, Hypertension, and Results (BW).    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 patient limited her fluid intake without improvement  Patient counseled about discontinuing ACE inhibitor lisinopril repeat BMP in 4 weeks  Obtain urine osmolality today  -Hypertension controlled may increase Imdur to take 120 mg daily  -CHF compensated discontinue Lasix discontinue potassium  -- Hypertension controlled record from home reviewed range 120/70  --Diabetes controlled hemoglobin A1c 6.9 discontinued from continue low-fat diet continue low-carb diet exercise and weight loss  -CAD continue aspirin Plavix  -Chronic kidney disease stage III A stable maximize medical management low-salt diet avoid any NSAIDs.  Follow-up renal function  -Blood pressure record from home reviewed with patient range 120/70.  - Diabetes doing much better, hemoglobin A1c 7.5 continue with current medication.  - IBS continue monitoring closely symptoms are controlled now.,  Continues hide fiber diet compensated  - CHF compensated low-salt diet no leg swelling no shortness of breath continue low-salt diet continue l fluid restriction  --History of gastric ulcer in remission no abdominal pain no hematemesis or melena continue with current medication.  Remove that  -Hypertension controlled continue with current medication continue monitoring at home as recommended  - Hypercholesterolemia controlled continue with current medication follow-up as scheduled  Follow-up 4 weeks      Diabetes  Pertinent negatives for hypoglycemia include no dizziness or headaches. Associated symptoms include fatigue. Pertinent negatives for diabetes include no chest pain.   Hyperlipidemia  Pertinent negatives include no chest pain.   Hypertension  Pertinent negatives include no chest pain, headaches or palpitations.          Review of  Systems   Constitutional:  Positive for fatigue. Negative for fever and unexpected weight change.   HENT:  Negative for congestion, ear discharge, ear pain, mouth sores, sinus pain and sore throat.    Eyes:  Negative for visual disturbance.   Respiratory:  Negative for cough and wheezing.    Cardiovascular:  Negative for chest pain, palpitations and leg swelling.   Gastrointestinal:  Negative for abdominal pain, blood in stool and diarrhea.   Genitourinary:  Negative for difficulty urinating.   Musculoskeletal:  Negative for arthralgias.   Skin:  Negative for rash.   Neurological:  Negative for dizziness and headaches.   Hematological:  Does not bruise/bleed easily.   Psychiatric/Behavioral:  Negative for behavioral problems.    All other systems reviewed and are negative.      Objective   Lab Results   Component Value Date    HGBA1C 6.9 (H) 06/17/2025      /80   Pulse 67   Temp 36.5 °C (97.7 °F)   Wt 49.3 kg (108 lb 9.6 oz)   SpO2 100%   BMI 19.86 kg/m²   Lab Results   Component Value Date    WBC 7.1 06/17/2025    HGB 12.8 06/17/2025    HCT 40.6 06/17/2025     06/17/2025    CHOL 113 06/17/2025    TRIG 113 06/17/2025    HDL 41 (L) 06/17/2025    ALT 11 06/17/2025    AST 16 06/17/2025     (L) 06/17/2025    K 4.4 06/17/2025    CL 90 (L) 06/17/2025    CREATININE 0.92 06/17/2025    BUN 12 06/17/2025    CO2 25 06/17/2025    TSH 1.76 06/17/2025    INR 1.5 (H) 12/09/2019    HGBA1C 6.9 (H) 06/17/2025     par   Physical Exam  Vitals and nursing note reviewed.   Constitutional:       Appearance: Normal appearance.   HENT:      Head: Normocephalic.      Nose: Nose normal.   Eyes:      Conjunctiva/sclera: Conjunctivae normal.      Pupils: Pupils are equal, round, and reactive to light.   Cardiovascular:      Rate and Rhythm: Regular rhythm.   Pulmonary:      Effort: Pulmonary effort is normal.      Breath sounds: Normal breath sounds.   Abdominal:      General: Abdomen is flat.      Palpations: Abdomen is  soft.   Musculoskeletal:      Cervical back: Neck supple.   Skin:     General: Skin is warm.   Neurological:      General: No focal deficit present.      Mental Status: She is oriented to person, place, and time.   Psychiatric:         Mood and Affect: Mood normal.         Assessment/Plan   Sofie was seen today for diabetes, hyperlipidemia, hypertension and results.  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 patient limited her fluid intake without improvement  Patient counseled about discontinuing ACE inhibitor lisinopril repeat BMP in 4 weeks  Obtain urine osmolality today  -Hypertension controlled may increase Imdur to take 120 mg daily  -CHF compensated discontinue Lasix discontinue potassium  -- Hypertension controlled record from home reviewed range 120/70  --Diabetes controlled hemoglobin A1c 6.9 discontinued from continue low-fat diet continue low-carb diet exercise and weight loss  -CAD continue aspirin Plavix  -Chronic kidney disease stage III A stable maximize medical management low-salt diet avoid any NSAIDs.  Follow-up renal function  -Blood pressure record from home reviewed with patient range 120/70.  - Diabetes doing much better, hemoglobin A1c 7.5 continue with current medication.  - IBS continue monitoring  closely symptoms are controlled now.,  Continues hide fiber diet compensated  - CHF compensated low-salt diet no leg swelling no shortness of breath continue low-salt diet continue l fluid restriction  --History of gastric ulcer in remission no abdominal pain no hematemesis or melena continue with current medication.  Remove that  -Hypertension controlled continue with current medication continue monitoring at home as recommended  - Hypercholesterolemia controlled continue with current medication follow-up as scheduled  Follow-up 4 weeks

## 2025-07-11 LAB — OSMOLALITY UR: 205 MOSM/KG (ref 50–1200)

## 2025-08-06 DIAGNOSIS — E22.2 SIADH (SYNDROME OF INAPPROPRIATE ADH PRODUCTION) (MULTI): ICD-10-CM

## 2025-08-09 LAB
ANION GAP SERPL CALCULATED.4IONS-SCNC: 10 MMOL/L (CALC) (ref 7–17)
BUN SERPL-MCNC: 10 MG/DL (ref 7–25)
BUN/CREAT SERPL: ABNORMAL (CALC) (ref 6–22)
CALCIUM SERPL-MCNC: 9.2 MG/DL (ref 8.6–10.4)
CHLORIDE SERPL-SCNC: 98 MMOL/L (ref 98–110)
CO2 SERPL-SCNC: 23 MMOL/L (ref 20–32)
CREAT SERPL-MCNC: 0.95 MG/DL (ref 0.6–0.95)
EGFRCR SERPLBLD CKD-EPI 2021: 58 ML/MIN/1.73M2
GLUCOSE SERPL-MCNC: 164 MG/DL (ref 65–99)
POTASSIUM SERPL-SCNC: 4.4 MMOL/L (ref 3.5–5.3)
SODIUM SERPL-SCNC: 131 MMOL/L (ref 135–146)

## 2025-08-15 DIAGNOSIS — E11.9 DIABETES MELLITUS TYPE 2, NONINSULIN DEPENDENT (MULTI): ICD-10-CM

## 2025-08-18 RX ORDER — LINAGLIPTIN 5 MG/1
5 TABLET, FILM COATED ORAL DAILY
Qty: 90 TABLET | Refills: 0 | Status: SHIPPED | OUTPATIENT
Start: 2025-08-18

## 2025-08-20 ENCOUNTER — APPOINTMENT (OUTPATIENT)
Dept: PRIMARY CARE | Facility: CLINIC | Age: 85
End: 2025-08-20
Payer: MEDICARE

## 2025-08-20 VITALS
BODY MASS INDEX: 19.28 KG/M2 | DIASTOLIC BLOOD PRESSURE: 70 MMHG | SYSTOLIC BLOOD PRESSURE: 120 MMHG | TEMPERATURE: 98.3 F | OXYGEN SATURATION: 99 % | WEIGHT: 105.4 LBS | HEART RATE: 68 BPM

## 2025-08-20 DIAGNOSIS — E11.9 TYPE 2 DIABETES MELLITUS WITHOUT COMPLICATION, WITHOUT LONG-TERM CURRENT USE OF INSULIN: ICD-10-CM

## 2025-08-20 DIAGNOSIS — I50.9 CHF (NYHA CLASS II, ACC/AHA STAGE C) (MULTI): ICD-10-CM

## 2025-08-20 DIAGNOSIS — R73.09 ABNORMAL BLOOD SUGAR: Primary | ICD-10-CM

## 2025-08-20 DIAGNOSIS — E87.6 HYPOKALEMIA: ICD-10-CM

## 2025-08-20 DIAGNOSIS — I10 HYPERTENSION, UNSPECIFIED TYPE: ICD-10-CM

## 2025-08-20 DIAGNOSIS — E87.1 HYPONATREMIA: ICD-10-CM

## 2025-08-20 DIAGNOSIS — E83.42 HYPOMAGNESEMIA: ICD-10-CM

## 2025-08-20 DIAGNOSIS — Z79.899 HIGH RISK MEDICATION USE: ICD-10-CM

## 2025-08-20 PROCEDURE — 1160F RVW MEDS BY RX/DR IN RCRD: CPT | Performed by: INTERNAL MEDICINE

## 2025-08-20 PROCEDURE — 1159F MED LIST DOCD IN RCRD: CPT | Performed by: INTERNAL MEDICINE

## 2025-08-20 PROCEDURE — 3078F DIAST BP <80 MM HG: CPT | Performed by: INTERNAL MEDICINE

## 2025-08-20 PROCEDURE — 99214 OFFICE O/P EST MOD 30 MIN: CPT | Performed by: INTERNAL MEDICINE

## 2025-08-20 PROCEDURE — 3074F SYST BP LT 130 MM HG: CPT | Performed by: INTERNAL MEDICINE

## 2025-08-20 PROCEDURE — G2211 COMPLEX E/M VISIT ADD ON: HCPCS | Performed by: INTERNAL MEDICINE

## 2025-08-20 PROCEDURE — 1036F TOBACCO NON-USER: CPT | Performed by: INTERNAL MEDICINE

## 2025-08-20 RX ORDER — LANCETS
EACH MISCELLANEOUS
Qty: 200 EACH | Refills: 1 | Status: SHIPPED | OUTPATIENT
Start: 2025-08-20

## 2025-08-20 ASSESSMENT — ENCOUNTER SYMPTOMS
ABDOMINAL PAIN: 0
WHEEZING: 0
FEVER: 0
SORE THROAT: 0
FATIGUE: 0
DIZZINESS: 0
HEADACHES: 0
SINUS PAIN: 0
ARTHRALGIAS: 0
BLOOD IN STOOL: 0
BRUISES/BLEEDS EASILY: 0
COUGH: 0
DIFFICULTY URINATING: 0
PALPITATIONS: 0
UNEXPECTED WEIGHT CHANGE: 0
DIARRHEA: 0
HYPERTENSION: 1

## 2025-11-25 ENCOUNTER — APPOINTMENT (OUTPATIENT)
Dept: PRIMARY CARE | Facility: CLINIC | Age: 85
End: 2025-11-25
Payer: MEDICARE

## 2026-01-13 ENCOUNTER — APPOINTMENT (OUTPATIENT)
Dept: OTOLARYNGOLOGY | Facility: CLINIC | Age: 86
End: 2026-01-13
Payer: MEDICARE